# Patient Record
Sex: FEMALE | Race: WHITE | URBAN - METROPOLITAN AREA
[De-identification: names, ages, dates, MRNs, and addresses within clinical notes are randomized per-mention and may not be internally consistent; named-entity substitution may affect disease eponyms.]

---

## 2017-06-11 ENCOUNTER — INPATIENT (INPATIENT)
Facility: HOSPITAL | Age: 82
LOS: 3 days | DRG: 208 | End: 2017-06-15
Attending: INTERNAL MEDICINE | Admitting: INTERNAL MEDICINE
Payer: MEDICARE

## 2017-06-11 VITALS
HEIGHT: 60 IN | TEMPERATURE: 98 F | OXYGEN SATURATION: 99 % | DIASTOLIC BLOOD PRESSURE: 70 MMHG | HEART RATE: 89 BPM | WEIGHT: 95.02 LBS | RESPIRATION RATE: 19 BRPM | SYSTOLIC BLOOD PRESSURE: 106 MMHG

## 2017-06-11 DIAGNOSIS — Z29.9 ENCOUNTER FOR PROPHYLACTIC MEASURES, UNSPECIFIED: ICD-10-CM

## 2017-06-11 DIAGNOSIS — J69.0 PNEUMONITIS DUE TO INHALATION OF FOOD AND VOMIT: ICD-10-CM

## 2017-06-11 DIAGNOSIS — R74.8 ABNORMAL LEVELS OF OTHER SERUM ENZYMES: ICD-10-CM

## 2017-06-11 DIAGNOSIS — I48.91 UNSPECIFIED ATRIAL FIBRILLATION: ICD-10-CM

## 2017-06-11 DIAGNOSIS — J18.9 PNEUMONIA, UNSPECIFIED ORGANISM: ICD-10-CM

## 2017-06-11 DIAGNOSIS — D68.62 LUPUS ANTICOAGULANT SYNDROME: ICD-10-CM

## 2017-06-11 LAB
ALBUMIN SERPL ELPH-MCNC: 2.1 G/DL — LOW (ref 3.5–5)
ALP SERPL-CCNC: 116 U/L — SIGNIFICANT CHANGE UP (ref 40–120)
ALT FLD-CCNC: 227 U/L DA — HIGH (ref 10–60)
ANION GAP SERPL CALC-SCNC: 6 MMOL/L — SIGNIFICANT CHANGE UP (ref 5–17)
APPEARANCE UR: CLEAR — SIGNIFICANT CHANGE UP
APTT BLD: 46.2 SEC — HIGH (ref 27.5–37.4)
AST SERPL-CCNC: 239 U/L — HIGH (ref 10–40)
BILIRUB SERPL-MCNC: 1.3 MG/DL — HIGH (ref 0.2–1.2)
BILIRUB UR-MCNC: NEGATIVE — SIGNIFICANT CHANGE UP
BUN SERPL-MCNC: 17 MG/DL — SIGNIFICANT CHANGE UP (ref 7–18)
CALCIUM SERPL-MCNC: 8.9 MG/DL — SIGNIFICANT CHANGE UP (ref 8.4–10.5)
CHLORIDE SERPL-SCNC: 101 MMOL/L — SIGNIFICANT CHANGE UP (ref 96–108)
CO2 SERPL-SCNC: 32 MMOL/L — HIGH (ref 22–31)
COLOR SPEC: YELLOW — SIGNIFICANT CHANGE UP
CREAT SERPL-MCNC: 0.81 MG/DL — SIGNIFICANT CHANGE UP (ref 0.5–1.3)
DIFF PNL FLD: ABNORMAL
GLUCOSE SERPL-MCNC: 84 MG/DL — SIGNIFICANT CHANGE UP (ref 70–99)
GLUCOSE UR QL: NEGATIVE — SIGNIFICANT CHANGE UP
HCT VFR BLD CALC: 37.8 % — SIGNIFICANT CHANGE UP (ref 34.5–45)
HGB BLD-MCNC: 11.9 G/DL — SIGNIFICANT CHANGE UP (ref 11.5–15.5)
INR BLD: 2.17 RATIO — HIGH (ref 0.88–1.16)
KETONES UR-MCNC: NEGATIVE — SIGNIFICANT CHANGE UP
LEUKOCYTE ESTERASE UR-ACNC: ABNORMAL
MCHC RBC-ENTMCNC: 31.5 GM/DL — LOW (ref 32–36)
MCHC RBC-ENTMCNC: 31.8 PG — SIGNIFICANT CHANGE UP (ref 27–34)
MCV RBC AUTO: 101 FL — HIGH (ref 80–100)
NITRITE UR-MCNC: POSITIVE
PH UR: 8 — SIGNIFICANT CHANGE UP (ref 5–8)
PLATELET # BLD AUTO: 296 K/UL — SIGNIFICANT CHANGE UP (ref 150–400)
POTASSIUM SERPL-MCNC: 5 MMOL/L — SIGNIFICANT CHANGE UP (ref 3.5–5.3)
POTASSIUM SERPL-SCNC: 5 MMOL/L — SIGNIFICANT CHANGE UP (ref 3.5–5.3)
PROT SERPL-MCNC: 7.4 G/DL — SIGNIFICANT CHANGE UP (ref 6–8.3)
PROT UR-MCNC: 100
PROTHROM AB SERPL-ACNC: 24 SEC — HIGH (ref 9.8–12.7)
RAPID RVP RESULT: SIGNIFICANT CHANGE UP
RBC # BLD: 3.74 M/UL — LOW (ref 3.8–5.2)
RBC # FLD: 15.2 % — HIGH (ref 10.3–14.5)
SODIUM SERPL-SCNC: 139 MMOL/L — SIGNIFICANT CHANGE UP (ref 135–145)
SP GR SPEC: 1.01 — SIGNIFICANT CHANGE UP (ref 1.01–1.02)
TROPONIN I SERPL-MCNC: <0.015 NG/ML — SIGNIFICANT CHANGE UP (ref 0–0.04)
UROBILINOGEN FLD QL: 4
WBC # BLD: 17.1 K/UL — HIGH (ref 3.8–10.5)
WBC # FLD AUTO: 17.1 K/UL — HIGH (ref 3.8–10.5)

## 2017-06-11 PROCEDURE — 99285 EMERGENCY DEPT VISIT HI MDM: CPT

## 2017-06-11 PROCEDURE — 76705 ECHO EXAM OF ABDOMEN: CPT | Mod: 26

## 2017-06-11 PROCEDURE — 71275 CT ANGIOGRAPHY CHEST: CPT | Mod: 26

## 2017-06-11 RX ORDER — SODIUM CHLORIDE 9 MG/ML
1000 INJECTION INTRAMUSCULAR; INTRAVENOUS; SUBCUTANEOUS
Qty: 0 | Refills: 0 | Status: DISCONTINUED | OUTPATIENT
Start: 2017-06-11 | End: 2017-06-13

## 2017-06-11 RX ORDER — ONDANSETRON 8 MG/1
4 TABLET, FILM COATED ORAL ONCE
Qty: 0 | Refills: 0 | Status: COMPLETED | OUTPATIENT
Start: 2017-06-11 | End: 2017-06-11

## 2017-06-11 RX ORDER — IPRATROPIUM/ALBUTEROL SULFATE 18-103MCG
3 AEROSOL WITH ADAPTER (GRAM) INHALATION EVERY 6 HOURS
Qty: 0 | Refills: 0 | Status: DISCONTINUED | OUTPATIENT
Start: 2017-06-11 | End: 2017-06-13

## 2017-06-11 RX ORDER — ALPRAZOLAM 0.25 MG
0.25 TABLET ORAL DAILY
Qty: 0 | Refills: 0 | Status: DISCONTINUED | OUTPATIENT
Start: 2017-06-11 | End: 2017-06-12

## 2017-06-11 RX ORDER — AZITHROMYCIN 500 MG/1
500 TABLET, FILM COATED ORAL ONCE
Qty: 0 | Refills: 0 | Status: COMPLETED | OUTPATIENT
Start: 2017-06-11 | End: 2017-06-11

## 2017-06-11 RX ORDER — PIPERACILLIN AND TAZOBACTAM 4; .5 G/20ML; G/20ML
3000 INJECTION, POWDER, LYOPHILIZED, FOR SOLUTION INTRAVENOUS ONCE
Qty: 3000 | Refills: 0 | Status: DISCONTINUED | OUTPATIENT
Start: 2017-06-11 | End: 2017-06-11

## 2017-06-11 RX ORDER — LATANOPROST 0.05 MG/ML
1 SOLUTION/ DROPS OPHTHALMIC; TOPICAL AT BEDTIME
Qty: 0 | Refills: 0 | Status: DISCONTINUED | OUTPATIENT
Start: 2017-06-11 | End: 2017-06-15

## 2017-06-11 RX ORDER — AMIODARONE HYDROCHLORIDE 400 MG/1
200 TABLET ORAL DAILY
Qty: 0 | Refills: 0 | Status: DISCONTINUED | OUTPATIENT
Start: 2017-06-11 | End: 2017-06-15

## 2017-06-11 RX ORDER — TIOTROPIUM BROMIDE 18 UG/1
1 CAPSULE ORAL; RESPIRATORY (INHALATION) DAILY
Qty: 0 | Refills: 0 | Status: DISCONTINUED | OUTPATIENT
Start: 2017-06-11 | End: 2017-06-13

## 2017-06-11 RX ORDER — VANCOMYCIN HCL 1 G
750 VIAL (EA) INTRAVENOUS ONCE
Qty: 0 | Refills: 0 | Status: COMPLETED | OUTPATIENT
Start: 2017-06-11 | End: 2017-06-11

## 2017-06-11 RX ORDER — DILTIAZEM HCL 120 MG
300 CAPSULE, EXT RELEASE 24 HR ORAL DAILY
Qty: 0 | Refills: 0 | Status: DISCONTINUED | OUTPATIENT
Start: 2017-06-11 | End: 2017-06-13

## 2017-06-11 RX ORDER — ACETAMINOPHEN 500 MG
650 TABLET ORAL EVERY 6 HOURS
Qty: 0 | Refills: 0 | Status: DISCONTINUED | OUTPATIENT
Start: 2017-06-11 | End: 2017-06-13

## 2017-06-11 RX ORDER — PIPERACILLIN AND TAZOBACTAM 4; .5 G/20ML; G/20ML
3.38 INJECTION, POWDER, LYOPHILIZED, FOR SOLUTION INTRAVENOUS ONCE
Qty: 0 | Refills: 0 | Status: COMPLETED | OUTPATIENT
Start: 2017-06-11 | End: 2017-06-11

## 2017-06-11 RX ORDER — PANTOPRAZOLE SODIUM 20 MG/1
40 TABLET, DELAYED RELEASE ORAL
Qty: 0 | Refills: 0 | Status: DISCONTINUED | OUTPATIENT
Start: 2017-06-11 | End: 2017-06-13

## 2017-06-11 RX ORDER — VANCOMYCIN HCL 1 G
750 VIAL (EA) INTRAVENOUS EVERY 12 HOURS
Qty: 0 | Refills: 0 | Status: DISCONTINUED | OUTPATIENT
Start: 2017-06-11 | End: 2017-06-14

## 2017-06-11 RX ORDER — PIPERACILLIN AND TAZOBACTAM 4; .5 G/20ML; G/20ML
3.38 INJECTION, POWDER, LYOPHILIZED, FOR SOLUTION INTRAVENOUS EVERY 8 HOURS
Qty: 0 | Refills: 0 | Status: DISCONTINUED | OUTPATIENT
Start: 2017-06-11 | End: 2017-06-15

## 2017-06-11 RX ORDER — WARFARIN SODIUM 2.5 MG/1
2 TABLET ORAL ONCE
Qty: 0 | Refills: 0 | Status: COMPLETED | OUTPATIENT
Start: 2017-06-11 | End: 2017-06-11

## 2017-06-11 RX ORDER — SODIUM CHLORIDE 9 MG/ML
1000 INJECTION INTRAMUSCULAR; INTRAVENOUS; SUBCUTANEOUS ONCE
Qty: 0 | Refills: 0 | Status: COMPLETED | OUTPATIENT
Start: 2017-06-11 | End: 2017-06-11

## 2017-06-11 RX ADMIN — AZITHROMYCIN 250 MILLIGRAM(S): 500 TABLET, FILM COATED ORAL at 12:17

## 2017-06-11 RX ADMIN — Medication 650 MILLIGRAM(S): at 21:16

## 2017-06-11 RX ADMIN — Medication 650 MILLIGRAM(S): at 21:50

## 2017-06-11 RX ADMIN — Medication 300 MILLIGRAM(S): at 14:38

## 2017-06-11 RX ADMIN — PANTOPRAZOLE SODIUM 40 MILLIGRAM(S): 20 TABLET, DELAYED RELEASE ORAL at 14:42

## 2017-06-11 RX ADMIN — ONDANSETRON 4 MILLIGRAM(S): 8 TABLET, FILM COATED ORAL at 11:28

## 2017-06-11 RX ADMIN — Medication 3 MILLILITER(S): at 14:42

## 2017-06-11 RX ADMIN — SODIUM CHLORIDE 70 MILLILITER(S): 9 INJECTION INTRAMUSCULAR; INTRAVENOUS; SUBCUTANEOUS at 18:08

## 2017-06-11 RX ADMIN — PIPERACILLIN AND TAZOBACTAM 25 GRAM(S): 4; .5 INJECTION, POWDER, LYOPHILIZED, FOR SOLUTION INTRAVENOUS at 21:15

## 2017-06-11 RX ADMIN — TIOTROPIUM BROMIDE 1 CAPSULE(S): 18 CAPSULE ORAL; RESPIRATORY (INHALATION) at 14:39

## 2017-06-11 RX ADMIN — Medication 150 MILLIGRAM(S): at 14:38

## 2017-06-11 RX ADMIN — PIPERACILLIN AND TAZOBACTAM 200 GRAM(S): 4; .5 INJECTION, POWDER, LYOPHILIZED, FOR SOLUTION INTRAVENOUS at 13:58

## 2017-06-11 RX ADMIN — WARFARIN SODIUM 2 MILLIGRAM(S): 2.5 TABLET ORAL at 21:15

## 2017-06-11 RX ADMIN — LATANOPROST 1 DROP(S): 0.05 SOLUTION/ DROPS OPHTHALMIC; TOPICAL at 21:16

## 2017-06-11 RX ADMIN — SODIUM CHLORIDE 4000 MILLILITER(S): 9 INJECTION INTRAMUSCULAR; INTRAVENOUS; SUBCUTANEOUS at 13:17

## 2017-06-11 RX ADMIN — SODIUM CHLORIDE 70 MILLILITER(S): 9 INJECTION INTRAMUSCULAR; INTRAVENOUS; SUBCUTANEOUS at 15:47

## 2017-06-11 RX ADMIN — Medication 150 MILLIGRAM(S): at 18:09

## 2017-06-11 NOTE — H&P ADULT - HISTORY OF PRESENT ILLNESS
85 year old female from home lives with daughter PMH of Lupus anticoagulant DVT b/l 15 years ago on coumadin , PAF on amiodarone , htn , hld  copd on 3 liter n/c at home presents to the ER with complaints of feeling malaise , SOB and productive cough for the past 2 days , patients states that she was in her usual state of health 2 days ago when she started getting naseous ( denies any vomiting) , she also had a runny nose and malaise claims the symptoms were similiar to when she had PNA , over the course of the next 2 days she developed productive cough , thick yellowish sputum about a tablespoon full each time she would cough , patient also noted subjective feve with chills , and decided to come to the hospital . CP, no abdominal pain, no vomiting, no urinary difficulties , denies any recent travel . , has a grand child who had URI like symptoms . off note patient also notes.Pt had a mechanical fall last week without head strike and bruised her L calf. ROS is negative except above.    In the ER patient's VS t 97.8 , hr 89 . bp 106/70 RR 19 pulse ox of 99 on 2 liters n/c , labs significant for wbc count of 17 . h/h 11/37.8 , INR 2.17 . elevated liver enzymes of bili 1.3 ,  . ALT of 227 . cta was done which showed:SIgnificant amount of debris within the mid to distal trachea and within   the segmental airway branches to the right lower lobe concerning for   mucous plugging or aspiration. No dense airspace consolidation, although scattered areas of tree-in-bud opacities are seen in the bilateral bases, which may be infectious in etiology.Patient got 1 dose of zosyn , azithromycin and vancomcyin in the ER. 85 year old female from home lives with daughter PMH of Lupus anticoagulant DVT b/l 15 years ago on coumadin , PAF on amiodarone , htn , hld  copd on 3 liter n/c at home presents to the ER with complaints of feeling malaise , SOB and productive cough for the past 2 days , patients states that she was in her usual state of health 2 days ago when she started getting naseous ( denies any vomiting) , she also had a runny nose and malaise claims the symptoms were similiar to when she had PNA , over the course of the next 2 days she developed productive cough , thick yellowish sputum about a tablespoon full each time she would cough , patient also noted subjective feve with chills , and decided to come to the hospital . CP, no abdominal pain, no vomiting, no urinary difficulties , denies any recent travel . , has a grand child who had URI like symptoms . off note patient also notes.Pt had a mechanical fall last week without head strike and bruised her L calf. ROS is negative except above.    In the ER patient's VS t 97.8 , hr 89 . bp 106/70 RR 19 pulse ox of 99 on 2 liters n/c , labs significant for wbc count of 17 . h/h 11/37.8 , INR 2.17 . elevated liver enzymes of bili 1.3 ,  . ALT of 227 . cta was done which showed:SIgnificant amount of debris within the mid to distal trachea and within   the segmental airway branches to the right lower lobe concerning for   mucous plugging or aspiration. No dense airspace consolidation, although scattered areas of tree-in-bud opacities are seen in the bilateral bases, which may be infectious in etiology.Patient got 1 dose of zosyn , azithromycin and vancomcyin in the ER.    SH: FORMER SMOKER 1 PPD /20 YEARS quit 20 years ago

## 2017-06-11 NOTE — H&P ADULT - ASSESSMENT
85 year old female from home lives with daughter PMH of Lupus anticoagulant DVT b/l 15 years ago on coumadin , PAF on amiodarone , htn , hld  copd on 3 liter n/c at home presents to the ER with complaints of feeling malaise , SOB and productive cough for the past 2 days is being admitted for pna.

## 2017-06-11 NOTE — ED PROVIDER NOTE - CARE PLAN
Principal Discharge DX:	Aspiration pneumonia of right lower lobe, unspecified aspiration pneumonia type

## 2017-06-11 NOTE — H&P ADULT - PROBLEM SELECTOR PLAN 1
PNA: WBC COUNT OF 17   qsofa score 0 does not meet sepsis criteria  nonsignificant amount of debris within the mid to distal trachea and within   the segmental airway branches to the right lower lobe concerning for   mucous plugging or aspiration. No dense airspace consolidation, although scattered areas of tree-in-bud opacities are seen in the bilateral bases, which may be infectious in etiology.Patient got 1 dose of zosyn , azithromycin and vancomcyin in the ER.  aspiration precautions  c/w iv abx f/u cultures nebs round the clock , oxygen supplementation  id evaluation PNA: WBC COUNT OF 17   qsofa score 0 does not meet sepsis criteria  nonsignificant amount of debris within the mid to distal trachea and within   the segmental airway branches to the right lower lobe concerning for   mucous plugging or aspiration. No dense airspace consolidation, although scattered areas of tree-in-bud opacities are seen in the bilateral bases, which may be infectious in etiology.Patient got 1 dose of zosyn , azithromycin and vancomcyin in the ER.  aspiration precautions  c/w iv abx f/u cultures nebs round the clock , oxygen supplementation  id evaluation  case d/w Dr Samuels c/w iv zosyn and vancocmyin Dr Britt ID

## 2017-06-11 NOTE — ED PROVIDER NOTE - MEDICAL DECISION MAKING DETAILS
Will get labs, CXR, give nausea meds, and reassess. labs pertinent for leukocytosis, cta pertinent for no PE, + debris in RLL and trachea concerning for aspiration pna  covered with zosyn, vanc, azithro. sent viral panel given sob. pan cultured. discussed results with pt. can benefit from speech and swallow eval during admission.

## 2017-06-11 NOTE — ED PROVIDER NOTE - OBJECTIVE STATEMENT
84 y/o female with PMHx of Lupus Anticoagulant and DVT presents to the ED c/o nausea and feeling sick for a few days. Pt notes associated chills and SOB. Pt had a mechanical fall last week without head strike and bruised her L calf. Pt is on blood thinners for Lupus anticoagulant and a blood clot 17 years ago. Pt denies fever, CP, cough, abdominal pain, vomiting, urinary difficulties, or any other complaints. NKDA.

## 2017-06-11 NOTE — ED PROVIDER NOTE - NS ED MD SCRIBE ATTENDING SCRIBE SECTIONS
HISTORY OF PRESENT ILLNESS/DISPOSITION/VITAL SIGNS( Pullset)/REVIEW OF SYSTEMS/PHYSICAL EXAM/PAST MEDICAL/SURGICAL/SOCIAL HISTORY

## 2017-06-11 NOTE — ED ADULT NURSE NOTE - OBJECTIVE STATEMENT
axox3 ambulates with assistance presented with daughter c/o generalized body ache and difficulty in breathing axox3 ambulates with assistance presented with daughter c/o generalized body ache and difficulty in breathing Pt noted cathectic with ecchymosis to upper and lower extremities

## 2017-06-11 NOTE — H&P ADULT - PROBLEM SELECTOR PLAN 2
elevated liver enzymes: elevated liver enzymes of bili 1.3 ,  . ALT of 227 .   uncertain etiology , patient is not complaining of abdominal pain  elevated pro bnp [id number]   f/u us gall bladder f/u hepatitis profile hold statuin f/u echo to r/o congestion

## 2017-06-11 NOTE — ED PROVIDER NOTE - MUSCULOSKELETAL, MLM
Spine appears normal, range of motion is not limited, no muscle or joint tenderness. No bony deformity.

## 2017-06-11 NOTE — H&P ADULT - ATTENDING COMMENTS
85 year old female from home lives with daughter PMH of Lupus anticoagulant DVT b/l 15 years ago on coumadin , PAF on amiodarone , htn , hld  copd on 3 liter n/c at home presents to the ER with complaints of feeling malaise , SOB and productive cough for the past 2 days , patients states that she was in her usual state of health 2 days ago when she started getting naseous ( denies any vomiting) , she also had a runny nose and malaise claims the symptoms were similiar to when she had PNA , over the course of the next 2 days she developed productive cough , thick yellowish sputum about a tablespoon full each time she would cough , patient also noted subjective feve with chills , and decided to come to the hospital . CP, no abdominal pain, no vomiting, no urinary difficulties , denies any recent travel . , has a grand child who had URI like symptoms . off note patient also notes.Pt had a mechanical fall last week without head strike and bruised her L calf. ROS is negative except above.    In the ER patient's VS t 97.8 , hr 89 . bp 106/70 RR 19 pulse ox of 99 on 2 liters n/c , labs significant for wbc count of 17 . h/h 11/37.8 , INR 2.17 . elevated liver enzymes of bili 1.3 ,  . ALT of 227 . cta was done which showed: Significant amount of debris within the mid to distal trachea and within the segmental airway branches to the right lower lobe concerning for mucous plugging or aspiration. No dense airspace consolidation, although scattered areas of tree-in-bud opacities are seen in the bilateral bases, which may be infectious in etiology. Patient got 1 dose of zosyn , azithromycin and vancomcyin in the ER.    SH: FORMER SMOKER 1 PPD /20 YEARS quit 20 years ago     pt seen in bed, vitals stable except for , physical exam reveals lungs right lower lobe ronchi, heart s1s2, abd soft nd nt bs+, ext no edema. labs and diagnostic test result reviewed.    assessment  --  pneumonia possible 2nd to aspiration, sepsis, microscopic hematuria, proteinuria, transaminitis, cholelithiasis,  h/o Lupus anticoagulant DVT b/l 15 years ago on coumadin , PAF on amiodarone , htn , hld  copd on 3 liter n/c at home    plan  --  admit to med, vanco, zosyn, atrov and prov nebs, supplemental oxygen, cont preadmit home meds, gi and dvt profilaxis,   cbc, bmp, mg, phos, lipids, tsh, bld cx, ua, ucx, speut cx,     echo    id cons  pulm cons  urology cons 85 year old female from home lives with daughter PMH of Lupus anticoagulant DVT b/l 15 years ago on coumadin , PAF on amiodarone , htn , hld  copd on 3 liter n/c at home presents to the ER with complaints of feeling malaise , SOB and productive cough for the past 2 days , patients states that she was in her usual state of health 2 days ago when she started getting naseous ( denies any vomiting) , she also had a runny nose and malaise claims the symptoms were similiar to when she had PNA , over the course of the next 2 days she developed productive cough , thick yellowish sputum about a tablespoon full each time she would cough , patient also noted subjective feve with chills , and decided to come to the hospital . CP, no abdominal pain, no vomiting, no urinary difficulties , denies any recent travel . , has a grand child who had URI like symptoms . off note patient also notes.Pt had a mechanical fall last week without head strike and bruised her L calf. ROS is negative except above.    In the ER patient's VS t 97.8 , hr 89 . bp 106/70 RR 19 pulse ox of 99 on 2 liters n/c , labs significant for wbc count of 17 . h/h 11/37.8 , INR 2.17 . elevated liver enzymes of bili 1.3 ,  . ALT of 227 . cta was done which showed: Significant amount of debris within the mid to distal trachea and within the segmental airway branches to the right lower lobe concerning for mucous plugging or aspiration. No dense airspace consolidation, although scattered areas of tree-in-bud opacities are seen in the bilateral bases, which may be infectious in etiology. Patient got 1 dose of zosyn , azithromycin and vancomcyin in the ER.    SH: FORMER SMOKER 1 PPD /20 YEARS quit 20 years ago     pt seen in bed, vitals stable except for episode of hypotension, physical exam reveals lungs with decrease breath sounds right lower lobe, heart s1s2, abd soft nd nt bs+, ext left leg ecchimosis and dry skin scalesno edema. labs and diagnostic test result reviewed.    assessment  --  pneumonia possible 2nd to aspiration, sepsis, microscopic hematuria, proteinuria, transaminitis, cholelithiasis,  h/o Lupus anticoagulant DVT b/l 15 years ago on coumadin , PAF on amiodarone , htn , hld  copd on 3 liter n/c at home    plan  --  admit to med, vanco, zosyn, atrov and prov nebs, supplemental oxygen, cont preadmit home meds, gi and dvt profilaxis,   cbc, bmp, mg, phos, lipids, tsh, inr, bld cx, ua, ucx, speut cx,     echo    id cons  pulm cons  urology cons

## 2017-06-12 DIAGNOSIS — I10 ESSENTIAL (PRIMARY) HYPERTENSION: ICD-10-CM

## 2017-06-12 DIAGNOSIS — J90 PLEURAL EFFUSION, NOT ELSEWHERE CLASSIFIED: ICD-10-CM

## 2017-06-12 DIAGNOSIS — E78.5 HYPERLIPIDEMIA, UNSPECIFIED: ICD-10-CM

## 2017-06-12 DIAGNOSIS — D64.9 ANEMIA, UNSPECIFIED: ICD-10-CM

## 2017-06-12 DIAGNOSIS — R80.9 PROTEINURIA, UNSPECIFIED: ICD-10-CM

## 2017-06-12 DIAGNOSIS — N39.0 URINARY TRACT INFECTION, SITE NOT SPECIFIED: ICD-10-CM

## 2017-06-12 DIAGNOSIS — R31.29 OTHER MICROSCOPIC HEMATURIA: ICD-10-CM

## 2017-06-12 DIAGNOSIS — J44.9 CHRONIC OBSTRUCTIVE PULMONARY DISEASE, UNSPECIFIED: ICD-10-CM

## 2017-06-12 LAB
24R-OH-CALCIDIOL SERPL-MCNC: 8 NG/ML — LOW (ref 30–100)
ALBUMIN SERPL ELPH-MCNC: 1.7 G/DL — LOW (ref 3.5–5)
ALP SERPL-CCNC: 95 U/L — SIGNIFICANT CHANGE UP (ref 40–120)
ALT FLD-CCNC: 182 U/L DA — HIGH (ref 10–60)
ANION GAP SERPL CALC-SCNC: 7 MMOL/L — SIGNIFICANT CHANGE UP (ref 5–17)
APTT BLD: 44.9 SEC — HIGH (ref 27.5–37.4)
AST SERPL-CCNC: 171 U/L — HIGH (ref 10–40)
BASOPHILS # BLD AUTO: 0.1 K/UL — SIGNIFICANT CHANGE UP (ref 0–0.2)
BASOPHILS NFR BLD AUTO: 0.5 % — SIGNIFICANT CHANGE UP (ref 0–2)
BILIRUB SERPL-MCNC: 0.7 MG/DL — SIGNIFICANT CHANGE UP (ref 0.2–1.2)
BUN SERPL-MCNC: 12 MG/DL — SIGNIFICANT CHANGE UP (ref 7–18)
CALCIUM SERPL-MCNC: 7.9 MG/DL — LOW (ref 8.4–10.5)
CHLORIDE SERPL-SCNC: 104 MMOL/L — SIGNIFICANT CHANGE UP (ref 96–108)
CHOLEST SERPL-MCNC: 158 MG/DL — SIGNIFICANT CHANGE UP (ref 10–199)
CO2 SERPL-SCNC: 27 MMOL/L — SIGNIFICANT CHANGE UP (ref 22–31)
CREAT SERPL-MCNC: 0.46 MG/DL — LOW (ref 0.5–1.3)
EOSINOPHIL # BLD AUTO: 0 K/UL — SIGNIFICANT CHANGE UP (ref 0–0.5)
EOSINOPHIL NFR BLD AUTO: 0.3 % — SIGNIFICANT CHANGE UP (ref 0–6)
GLUCOSE SERPL-MCNC: 82 MG/DL — SIGNIFICANT CHANGE UP (ref 70–99)
HAV IGM SER-ACNC: SIGNIFICANT CHANGE UP
HBA1C BLD-MCNC: 5.5 % — SIGNIFICANT CHANGE UP (ref 4–5.6)
HBV CORE IGM SER-ACNC: SIGNIFICANT CHANGE UP
HBV SURFACE AG SER-ACNC: SIGNIFICANT CHANGE UP
HCT VFR BLD CALC: 31.6 % — LOW (ref 34.5–45)
HCV AB S/CO SERPL IA: 0.17 S/CO — SIGNIFICANT CHANGE UP
HCV AB SERPL-IMP: SIGNIFICANT CHANGE UP
HDLC SERPL-MCNC: 71 MG/DL — SIGNIFICANT CHANGE UP (ref 40–125)
HGB BLD-MCNC: 10.1 G/DL — LOW (ref 11.5–15.5)
INR BLD: 2.33 RATIO — HIGH (ref 0.88–1.16)
LIPID PNL WITH DIRECT LDL SERPL: 70 MG/DL — SIGNIFICANT CHANGE UP
LYMPHOCYTES # BLD AUTO: 0.8 K/UL — LOW (ref 1–3.3)
LYMPHOCYTES # BLD AUTO: 6.2 % — LOW (ref 13–44)
MAGNESIUM SERPL-MCNC: 2.1 MG/DL — SIGNIFICANT CHANGE UP (ref 1.6–2.6)
MCHC RBC-ENTMCNC: 32 GM/DL — SIGNIFICANT CHANGE UP (ref 32–36)
MCHC RBC-ENTMCNC: 32.4 PG — SIGNIFICANT CHANGE UP (ref 27–34)
MCV RBC AUTO: 101 FL — HIGH (ref 80–100)
MONOCYTES # BLD AUTO: 1.6 K/UL — HIGH (ref 0–0.9)
MONOCYTES NFR BLD AUTO: 11.8 % — SIGNIFICANT CHANGE UP (ref 2–14)
NEUTROPHILS # BLD AUTO: 11.1 K/UL — HIGH (ref 1.8–7.4)
NEUTROPHILS NFR BLD AUTO: 81.2 % — HIGH (ref 43–77)
PHOSPHATE SERPL-MCNC: 2.5 MG/DL — SIGNIFICANT CHANGE UP (ref 2.5–4.5)
PLATELET # BLD AUTO: 228 K/UL — SIGNIFICANT CHANGE UP (ref 150–400)
POTASSIUM SERPL-MCNC: 3.7 MMOL/L — SIGNIFICANT CHANGE UP (ref 3.5–5.3)
POTASSIUM SERPL-SCNC: 3.7 MMOL/L — SIGNIFICANT CHANGE UP (ref 3.5–5.3)
PROT SERPL-MCNC: 5.8 G/DL — LOW (ref 6–8.3)
PROTHROM AB SERPL-ACNC: 25.8 SEC — HIGH (ref 9.8–12.7)
RBC # BLD: 3.13 M/UL — LOW (ref 3.8–5.2)
RBC # FLD: 15.4 % — HIGH (ref 10.3–14.5)
SODIUM SERPL-SCNC: 138 MMOL/L — SIGNIFICANT CHANGE UP (ref 135–145)
TOTAL CHOLESTEROL/HDL RATIO MEASUREMENT: 2.2 RATIO — LOW (ref 3.3–7.1)
TRIGL SERPL-MCNC: 84 MG/DL — SIGNIFICANT CHANGE UP (ref 10–149)
TSH SERPL-MCNC: 0.5 UU/ML — SIGNIFICANT CHANGE UP (ref 0.34–4.82)
WBC # BLD: 13.6 K/UL — HIGH (ref 3.8–10.5)
WBC # FLD AUTO: 13.6 K/UL — HIGH (ref 3.8–10.5)

## 2017-06-12 RX ORDER — WARFARIN SODIUM 2.5 MG/1
2 TABLET ORAL ONCE
Qty: 0 | Refills: 0 | Status: COMPLETED | OUTPATIENT
Start: 2017-06-12 | End: 2017-06-12

## 2017-06-12 RX ORDER — ALPRAZOLAM 0.25 MG
0.25 TABLET ORAL AT BEDTIME
Qty: 0 | Refills: 0 | Status: DISCONTINUED | OUTPATIENT
Start: 2017-06-12 | End: 2017-06-13

## 2017-06-12 RX ORDER — ONDANSETRON 8 MG/1
4 TABLET, FILM COATED ORAL EVERY 6 HOURS
Qty: 0 | Refills: 0 | Status: DISCONTINUED | OUTPATIENT
Start: 2017-06-12 | End: 2017-06-15

## 2017-06-12 RX ADMIN — LATANOPROST 1 DROP(S): 0.05 SOLUTION/ DROPS OPHTHALMIC; TOPICAL at 22:22

## 2017-06-12 RX ADMIN — Medication 300 MILLIGRAM(S): at 05:33

## 2017-06-12 RX ADMIN — PANTOPRAZOLE SODIUM 40 MILLIGRAM(S): 20 TABLET, DELAYED RELEASE ORAL at 05:34

## 2017-06-12 RX ADMIN — WARFARIN SODIUM 2 MILLIGRAM(S): 2.5 TABLET ORAL at 22:22

## 2017-06-12 RX ADMIN — ONDANSETRON 4 MILLIGRAM(S): 8 TABLET, FILM COATED ORAL at 09:49

## 2017-06-12 RX ADMIN — PIPERACILLIN AND TAZOBACTAM 25 GRAM(S): 4; .5 INJECTION, POWDER, LYOPHILIZED, FOR SOLUTION INTRAVENOUS at 22:22

## 2017-06-12 RX ADMIN — PIPERACILLIN AND TAZOBACTAM 25 GRAM(S): 4; .5 INJECTION, POWDER, LYOPHILIZED, FOR SOLUTION INTRAVENOUS at 17:19

## 2017-06-12 RX ADMIN — PIPERACILLIN AND TAZOBACTAM 25 GRAM(S): 4; .5 INJECTION, POWDER, LYOPHILIZED, FOR SOLUTION INTRAVENOUS at 05:23

## 2017-06-12 RX ADMIN — Medication 0.25 MILLIGRAM(S): at 22:22

## 2017-06-12 RX ADMIN — AMIODARONE HYDROCHLORIDE 200 MILLIGRAM(S): 400 TABLET ORAL at 05:33

## 2017-06-12 RX ADMIN — Medication 150 MILLIGRAM(S): at 18:37

## 2017-06-12 RX ADMIN — Medication 150 MILLIGRAM(S): at 05:21

## 2017-06-12 NOTE — PROGRESS NOTE ADULT - SUBJECTIVE AND OBJECTIVE BOX
INTERVAL HPI/OVERNIGHT EVENTS: No acute overnight events , reported feeling nausea , improved SOB  Patient is a 85y old  Female who presents with a chief complaint of SOB, productive cough , malaise     T(C): 36.2, Max: 36.8 ( @ 20:40)  HR: 78 (76 - 89)  BP: 107/44 (97/32 - 131/53)  RR: 16 (16 - 19)  SpO2: 98% (96% - 100%)        LABS:                        10.1   13.6  )-----------( 228      ( 2017 06:32 )             31.6     06-12    138  |  104  |  12  ----------------------------<  82  3.7   |  27  |  0.46<L>    Ca    7.9<L>      2017 06:32  Phos  2.5     12  Mg     2.1     -12    TPro  5.8<L>  /  Alb  1.7<L>  /  TBili  0.7  /  DBili  x   /  AST  171<H>  /  ALT  182<H>  /  AlkPhos  95  06-12    PT/INR - ( 2017 06:32 )   PT: 25.8 sec;   INR: 2.33 ratio         PTT - ( 2017 06:32 )  PTT:44.9 sec  Urinalysis Basic - ( 2017 13:25 )    Color: Yellow / Appearance: Clear / S.010 / pH: x  Gluc: x / Ketone: Negative  / Bili: Negative / Urobili: 4   Blood: x / Protein: 100 / Nitrite: Positive   Leuk Esterase: Moderate / RBC: 25-50 /HPF / WBC 3-5 /HPF   Sq Epi: x / Non Sq Epi: Few / Bacteria: Moderate /HPF          RADIOLOGY & ADDITIONAL TESTS:    Imaging Personally Reviewed:  [ ] YES  [ ] NO    Consultant(s) Notes Reviewed:  [ ] YES  [ ] NO    PHYSICAL EXAM:  GENERAL: NAD, on 3 L NC   NECK: Supple, No JVD, Normal thyroid  NERVOUS SYSTEM:  Alert & Oriented X3, Good concentration;   CHEST/LUNG: Left base crackles   HEART: Regular rate and rhythm  ABDOMEN: Soft, Nontender, Nondistended; Bowel sounds present  EXTREMITIES:  No edema, No calf tenderness  SKIN: bruise over left leg, scattered senile purpura over hands.    Care Discussed with the attending [ ] YES

## 2017-06-12 NOTE — CONSULT NOTE ADULT - PROBLEM SELECTOR RECOMMENDATION 9
1- Continue IV Vancomycin.  2- Continue Zosun.  3- O2 as needed.  4- Pulmonary management.  5- F/u CXR.   6- COPD management  7- Follow vancomycin trough.

## 2017-06-12 NOTE — CONSULT NOTE ADULT - SUBJECTIVE AND OBJECTIVE BOX
HPI:  85 year old female from home lives with daughter PMH of Lupus anticoagulant DVT b/l 15 years ago on coumadin , PAF on amiodarone , htn , hld  copd on 3 liter n/c at home presents to the ER with complaints of feeling malaise , SOB and productive cough for the past 2 days , patients states that she was in her usual state of health 2 days ago when she started getting naseous ( denies any vomiting) , she also had a runny nose and malaise claims the symptoms were similiar to when she had PNA , over the course of the next 2 days she developed productive cough , thick yellowish sputum about a tablespoon full each time she would cough , patient also noted subjective feve with chills , and decided to come to the hospital . CP, no abdominal pain, no vomiting, no urinary difficulties , denies any recent travel . , has a grand child who had URI like symptoms . off note patient also notes.Pt had a mechanical fall last week without head strike and bruised her L calf. ROS is negative except above.    In the ER patient's VS t 97.8 , hr 89 . bp 106/70 RR 19 pulse ox of 99 on 2 liters n/c , labs significant for wbc count of 17 . h/h 11/37.8 , INR 2.17 . elevated liver enzymes of bili 1.3 ,  . ALT of 227 . cta was done which showed:SIgnificant amount of debris within the mid to distal trachea and within   the segmental airway branches to the right lower lobe concerning for   mucous plugging or aspiration. No dense airspace consolidation, although scattered areas of tree-in-bud opacities are seen in the bilateral bases, which may be infectious in etiology.Patient got 1 dose of zosyn , azithromycin and vancomcyin in the ER.    SH: FORMER SMOKER 1 PPD /20 YEARS quit 20 years ago (2017 13:21)      PAST MEDICAL & SURGICAL HISTORY:  DVT (deep venous thrombosis)  Lupus anticoagulant disorder  No significant past surgical history      No Known Allergies      tiotropium 18 MICROgram(s) Capsule 1Capsule(s) Inhalation daily  pantoprazole    Tablet 40milliGRAM(s) Oral before breakfast  amiodarone    Tablet 200milliGRAM(s) Oral daily  diltiazem   CD 300milliGRAM(s) Oral daily  ALPRAZolam 0.25milliGRAM(s) Oral daily  latanoprost 0.005% Ophthalmic Solution 1Drop(s) Both EYES at bedtime  sodium chloride 0.9%. 1000milliLiter(s) IV Continuous <Continuous>  ALBUTerol/ipratropium for Nebulization 3milliLiter(s) Nebulizer every 6 hours  piperacillin/tazobactam IVPB. 3.375Gram(s) IV Intermittent every 8 hours  vancomycin  IVPB 750milliGRAM(s) IV Intermittent every 12 hours  acetaminophen   Tablet. 650milliGRAM(s) Oral every 6 hours PRN  warfarin 2milliGRAM(s) Oral once  ondansetron Injectable 4milliGRAM(s) IV Push every 6 hours PRN      Social Hx: Quit smocking 30 years ago.    FAMILY HISTORY: None contributory.        ROS  [  ] UNABLE TO ELICIT    General:  [  ] None  [ x ] Fever  [ x ] Chills  [ x ] Malaise    Skin:  [ x ] None [  ] Rash  [  ] Wound  [  ] Ulcer    HEENT:  [ x ] None  [  ] Sore Throat  [  ] Nasal congestion/ runny nose  [  ] Photophobia [  ] Neck pain      Chest:  [  ] None   [  ] SOB  [ x ] Cough  [  ] None    Cardiovascular:   [ x ] None  [  ] CP  [  ] Palpitation    Gastrointestinal:  [  ] None  [  ] Abd pain   [ x ] Nausea    [  ] Vomiting   [  ] Diarrhea	     Genitourinary:  [ x ] None [  ] Polyuria   [  ] Urgency  [  ] Frequency  [  ] Dysuria    [  ]  Hematuria       Musculoskeletal:  [ x ] None [  ] Back Pain	[  ] Body aches  [  ] Joint pain    Neurological: [  ] None [  ]Dizziness  [  ]Visual Disturbance  [  ]Headaches   [ x ] Weakness      PHYSICAL EXAM:    Vital Signs Last 24 Hrs  T(C): 36.2, Max: 36.8 (- @ 20:40)  T(F): 97.1, Max: 98.2 ( @ 20:40)  HR: 78 (76 - 82)  BP: 107/44 (97/32 - 131/53)  BP(mean): --  RR: 16 (16 - 18)  SpO2: 98% (96% - 100%)    Constitutional:    HEENT: [ x ] Wnl  [  ] Pharyngeal congestion    Neck:  [ x ] Supple  [  ]Lymphadenopathy  [ x ] No JVD   [  ] JVD  [  ] Masses   [ x ] WNL    CHEST/Respiratory:  [  ]Clear to auscultation  [ x ] Rales   [  ] Rhonchi   [  ] Wheezing     [  ] Chest Tenderness      Cardiovascular:  [ x ] Reg S1 S2   [  ] Irreg S1 S2   [ x ]No Murmur  [  ] +ve Murmurs  [  ]Systolic [  ]Diastolic      Abdomen:  [ x ] Soft  [ x ] No tendrerness  [  ] Tenderness  [  ] Organomegaly  [  ] ABD Distention  [  ] Rigidity                       [ x ] No Regidity                       [ x ] No Rebound Tenderness  [ x ] No Guarding Rigidity  [  ] Rebound Tenderness[  ] Guarding Rigidity                          [  x]  +ve Bowel Sounds  [  ] Decreased Bowel Sounds    [  ] Absent Bowel Sounds                            Extremities: [ x ] No edema [  ] Edema  [  ] Clubbing   [  ] Cyanosis                         [  ] No Tender Calf muscles  [  ] Tender Calf muscles                        [  ] Palpable peripheral pulses  Neurological: [ x ] Awake  [x  ] Alert  [ x ] Oriented  x 3                            [  ] Confused  [  ] Drowzy  [  ] repond to painful stimuli  [  ] Unresponsive    Skin:  [  ] Intact [  ] Redness [  ] Thrombophlebitis  [  ] Rashes  [  ] Dry  [ x ] Achymosis L leg and foot  Ortho:  [  ] Joint Swelling  [  ] Joint erythema [  ] Joint tenderness                [  ] Increased temp. to touch  [ x ] DJD       LABS/DIAGNOSTIC TESTS                          10.1   13.6  )-----------( 228      ( 2017 06:32 )             31.6     WBC Count: 13.6 K/uL ( @ 06:32)  WBC Count: 17.1 K/uL ( @ 11:14)      06-12    138  |  104  |  12  ----------------------------<  82  3.7   |  27  |  0.46<L>    Ca    7.9<L>      2017 06:32  Phos  2.5     12  Mg     2.1     12    TPro  5.8<L>  /  Alb  1.7<L>  /  TBili  0.7  /  DBili  x   /  AST  171<H>  /  ALT  182<H>  /  AlkPhos  95  06-12      Urinalysis Basic - ( 2017 13:25 )    Color: Yellow / Appearance: Clear / S.010 / pH: x  Gluc: x / Ketone: Negative  / Bili: Negative / Urobili: 4   Blood: x / Protein: 100 / Nitrite: Positive   Leuk Esterase: Moderate / RBC: 25-50 /HPF / WBC 3-5 /HPF   Sq Epi: x / Non Sq Epi: Few / Bacteria: Moderate /HPF        LIVER FUNCTIONS - ( 2017 06:32 )  Alb: 1.7 g/dL / Pro: 5.8 g/dL / ALK PHOS: 95 U/L / ALT: 182 U/L DA / AST: 171 U/L / GGT: x             PT/INR - ( 2017 06:32 )   PT: 25.8 sec;   INR: 2.33 ratio         PTT - ( 2017 06:32 )  PTT:44.9 sec    LACTATE:      CULTURES:     None yet.    RADIOLOGY    EXAM:  CT ANGIO CHEST (W)AW IC                            PROCEDURE DATE:  2017        INTERPRETATION:  CLINICAL INFORMATION: Shortness of breath. Chest pain.   History of lupus anticoagulant.    COMPARISON: None.    PROCEDURE:   CTA of the Chest was performed with intravenous contrast.  90 ml of Omnipaque 350 was injected intravenously. 10 ml were discarded.  Sagittal and coronal reformats were performed as well as 3D   Reconstructions.      FINDINGS:    LOWER NECK: Unremarkable.    LUNGS AND LARGE AIRWAYS: Significant amount of debris within the mid to   distal trachea and within the segmental airway branches to the right   lower lobe concerning for mucous plugging or aspiration. No dense   airspace consolidation, although scatteredareas of tree-in-bud opacities   are seen in the bilateral bases. There is moderate to severe   centrilobular emphysematous changes.  PLEURA: Small right pleural effusion.  VESSELS: Atherosclerotic changes of the thoracic aorta without aneurysm.   Noacute pulmonary embolus.  HEART: Heart size is normal. No pericardial effusion.  MEDIASTINUM AND ANDREAS: No lymphadenopathy.  CHEST WALL AND AXILLA: Incompletely imaged is a 4.2 x 1.7 cm subcutaneous   lesion within the lateral aspect of the lower leftchest wall   (3:110-1:15) containing at least two types of soft tissue internally.  VISUALIZED UPPER ABDOMEN: Partially imaged is a infrarenal abdominal   aortic aneurysm measuring at least 4.6 x 3.9 cm. 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 Left   upper quadrant surgical clips may indicate splenectomy. Correlate with   clinical history. Bilateral cystic renal lesions, incompletely evaluated.   A subcentimeter stone layers dependently within the extrarenal right   pelvis. IVC filter noted.  BONES: Multilevel degenerative changes. Diffuse osteopenia. Multiple   compression deformities within the thoracic spine.    IMPRESSION:    No acute pulmonary embolus.    Significant amount of debris within the mid to distal trachea and within   the segmental airway branches to the right lower lobe concerning for   mucous plugging or aspiration. No dense airspace consolidation, although   scattered areas of tree-in-bud opacities are seen in the bilateral bases,   which may be infectious in etiology.    Partially imaged infrarenal abdominal aortic aneurysm measuring at least   4.6 cm in transverse diameter.    Incompletely imaged subcutaneous lesion within the lateral aspect of the   lower left chest wall.    Findings were discussed by Dr. DONYA Hernandez with Dr. KP Hardin 2017   at 12:30 PM.        EXAM:  US LIVER AND PANCREAS                            PROCEDURE DATE:  2017        INTERPRETATION:  CLINICAL INFORMATION: Transaminitis.    COMPARISON: None available.    TECHNIQUE: Sonography of the right upper quadrant.     FINDINGS:    Liver: Bright echogenic dots on a background of decreased liver   parenchymal echogenicity (starry diane appearance).   Bile ducts: Normal caliber. Common hepatic duct measures 2 mm.   Gallbladder: Multiple small gallstones. No pericholecystic fluid or   gallbladder wall thickening. Sonographic Teixeira's sign was negative.      Pancreas: Not adequately visualized.  Right kidney: 9.4 x 4.3 x 3.5 cm cm. Normal cortical echogenicity and   thickness. No hydronephrosis.  Ascites: None.  IVC: Visualized portions are within normal limits.    IMPRESSION:     Cholelithiasis without sonographic evidence of acute cholecystitis.    Starry diane appearance of the liver. This is nonspecific and can be seen   in the setting of acute hepatitis, fasting, right heartfailure, and   infiltrative disease of the liver.

## 2017-06-12 NOTE — PROGRESS NOTE ADULT - PROBLEM SELECTOR PLAN 1
Improving WBC , reported feeling improvement in SOB   Continue with vancomycin and zosyn   ID follow up   Continue with duonebs , supplement 3L oxygen

## 2017-06-12 NOTE — CONSULT NOTE ADULT - SUBJECTIVE AND OBJECTIVE BOX
Patient is a 85y old  Female who presents with a chief complaint of     Called to see and eval 85 y.o. F w/ PMH significant for b/l DVT on coumadin for hematuria on UA. Pt admitted 17 for SOB, dx with PNA. Routine labs to check for source of infection included UA, which was positive for leukocytes, nitrites, and large blood. No other urological symptoms. Pt denies abd pain, back pain, dysuria, hematuria, hx renal calculi. Pt has had UTI in the past which were treated with abx.    PAST MEDICAL & SURGICAL HISTORY:  DVT (deep venous thrombosis)  Lupus anticoagulant disorder    MEDICATIONS  (STANDING):  tiotropium 18 MICROgram(s) Capsule 1Capsule(s) Inhalation daily  pantoprazole    Tablet 40milliGRAM(s) Oral before breakfast  amiodarone    Tablet 200milliGRAM(s) Oral daily  diltiazem   CD 300milliGRAM(s) Oral daily  latanoprost 0.005% Ophthalmic Solution 1Drop(s) Both EYES at bedtime  sodium chloride 0.9%. 1000milliLiter(s) IV Continuous <Continuous>  ALBUTerol/ipratropium for Nebulization 3milliLiter(s) Nebulizer every 6 hours  piperacillin/tazobactam IVPB. 3.375Gram(s) IV Intermittent every 8 hours  vancomycin  IVPB 750milliGRAM(s) IV Intermittent every 12 hours  warfarin 2milliGRAM(s) Oral once  ALPRAZolam 0.25milliGRAM(s) Oral at bedtime    MEDICATIONS  (PRN):  acetaminophen   Tablet. 650milliGRAM(s) Oral every 6 hours PRN Mild Pain (1 - 3)  ondansetron Injectable 4milliGRAM(s) IV Push every 6 hours PRN Nausea and/or Vomiting      Allergies    No Known Allergies    Intolerances        Vital Signs Last 24 Hrs  T(C): 35.9, Max: 36.8 (06-11 @ 20:40)  T(F): 96.7, Max: 98.2 (06-11 @ 20:40)  HR: 76 (76 - 80)  BP: 103/40 (97/32 - 107/44)  BP(mean): --  RR: 16 (16 - 17)  SpO2: 97% (96% - 99%)    Physical:  Gen: A&Ox3. NAD  Abd: Soft ND, NT  Pelvis: Soft reducible, NT right inguinal hernia noted  Back: no CVAT b/l    I&O's Summary    I & Os for current day (as of 2017 16:09)  =============================================  IN: 0 ml / OUT: 160 ml / NET: -160 ml      LABS:                        10.1   13.6  )-----------( 228      ( 2017 06:32 )             31.6              06-12    138  |  104  |  12  ----------------------------<  82  3.7   |  27  |  0.46<L>    Ca    7.9<L>      2017 06:32  Phos  2.5     06-12  Mg     2.1     06-12    TPro  5.8<L>  /  Alb  1.7<L>  /  TBili  0.7  /  DBili  x   /  AST  171<H>  /  ALT  182<H>  /  AlkPhos  95  06-12            PT/INR - ( 2017 06:32 )   PT: 25.8 sec;   INR: 2.33 ratio         PTT - ( 2017 06:32 )  PTT:44.9 sec  Urinalysis Basic - ( 2017 13:25 )    Color: Yellow / Appearance: Clear / S.010 / pH: x  Gluc: x / Ketone: Negative  / Bili: Negative / Urobili: 4   Blood: x / Protein: 100 / Nitrite: Positive   Leuk Esterase: Moderate / RBC: 25-50 /HPF / WBC 3-5 /HPF   Sq Epi: x / Non Sq Epi: Few / Bacteria: Moderate /HPF

## 2017-06-12 NOTE — PROGRESS NOTE ADULT - SUBJECTIVE AND OBJECTIVE BOX
Patient is a 85y old  Female who presents with a chief complaint of SOB    Called see and eval 85y.o. Female w/PMH significant for b/l DVT on coumadin for hematuria on UA. Pt admitted 17 for SOB, and dx with PNA. Routine labs to check for source of infection included UA which was positive for leukocytes, nitrites, and large blood. No other urologic symptoms. Pt denies abd pain, back pain, dysuria, hematuria, hx renal calculi. Pt has had UTI in the past which were resolved with abx.     PAST MEDICAL & SURGICAL HISTORY:  DVT (deep venous thrombosis)  Lupus anticoagulant disorder      MEDICATIONS  (STANDING):  tiotropium 18 MICROgram(s) Capsule 1Capsule(s) Inhalation daily  pantoprazole    Tablet 40milliGRAM(s) Oral before breakfast  amiodarone    Tablet 200milliGRAM(s) Oral daily  diltiazem   CD 300milliGRAM(s) Oral daily  ALPRAZolam 0.25milliGRAM(s) Oral daily  latanoprost 0.005% Ophthalmic Solution 1Drop(s) Both EYES at bedtime  sodium chloride 0.9%. 1000milliLiter(s) IV Continuous <Continuous>  ALBUTerol/ipratropium for Nebulization 3milliLiter(s) Nebulizer every 6 hours  piperacillin/tazobactam IVPB. 3.375Gram(s) IV Intermittent every 8 hours  vancomycin  IVPB 750milliGRAM(s) IV Intermittent every 12 hours  warfarin 2milliGRAM(s) Oral once    MEDICATIONS  (PRN):  acetaminophen   Tablet. 650milliGRAM(s) Oral every 6 hours PRN Mild Pain (1 - 3)  ondansetron Injectable 4milliGRAM(s) IV Push every 6 hours PRN Nausea and/or Vomiting      Allergies    No Known Allergies    Intolerances        Vital Signs Last 24 Hrs  T(C): 36.2, Max: 36.8 (06-11 @ 20:40)  T(F): 97.1, Max: 98.2 (06-11 @ 20:40)  HR: 78 (76 - 82)  BP: 107/44 (97/32 - 131/53)  BP(mean): --  RR: 16 (16 - 18)  SpO2: 98% (96% - 100%)    Physical:  Gen: A&Ox3. NAD  Abd: Soft ND, NT. Lower midline scar noted  Pelvis: Soft, reducible, nontender right inguinal hernia noted  Back: no CVAT b/l    I&O's Summary    I & Os for current day (as of 2017 12:35)  =============================================  IN: 0 ml / OUT: 160 ml / NET: -160 ml      LABS:                        10.1   13.6  )-----------( 228      ( 2017 06:32 )             31.6              06-12    138  |  104  |  12  ----------------------------<  82  3.7   |  27  |  0.46<L>    Ca    7.9<L>      2017 06:32  Phos  2.5     06-12  Mg     2.1     06-12    TPro  5.8<L>  /  Alb  1.7<L>  /  TBili  0.7  /  DBili  x   /  AST  171<H>  /  ALT  182<H>  /  AlkPhos  95  06-12            PT/INR - ( 2017 06:32 )   PT: 25.8 sec;   INR: 2.33 ratio         PTT - ( 2017 06:32 )  PTT:44.9 sec  Urinalysis Basic - ( 2017 13:25 )    Color: Yellow / Appearance: Clear / S.010 / pH: x  Gluc: x / Ketone: Negative  / Bili: Negative / Urobili: 4   Blood: x / Protein: 100 / Nitrite: Positive   Leuk Esterase: Moderate / RBC: 25-50 /HPF / WBC 3-5 /HPF   Sq Epi: x / Non Sq Epi: Few / Bacteria: Moderate /HPF

## 2017-06-12 NOTE — CONSULT NOTE ADULT - PROBLEM SELECTOR RECOMMENDATION 4
1- monitor Blood pressure closely.  2-Blood pressure control.  3- BP. meds as per cardiology and primary care team.

## 2017-06-12 NOTE — CONSULT NOTE ADULT - SUBJECTIVE AND OBJECTIVE BOX
PULMONARY CONSULT NOTE      MARS AIKEN  MRN-[id number]    Patient is a 85y old  Female who presents with a chief complaint of malaise, cough, sob, runny nose x2 days. Cough productive of thick yellowish sputum, 1 tbsp each time. Subjective fever and chills.  Sick contacts: 1 grand child with URI.  Former smoker, 1 pack/day/20 years. Quit 20 years ago.  Pt has h/o COPD on home oxygen 3 lts.    HISTORY OF PRESENT ILLNESS:    MEDICATIONS  (STANDING):  tiotropium 18 MICROgram(s) Capsule 1Capsule(s) Inhalation daily  pantoprazole    Tablet 40milliGRAM(s) Oral before breakfast  amiodarone    Tablet 200milliGRAM(s) Oral daily  diltiazem   CD 300milliGRAM(s) Oral daily  ALPRAZolam 0.25milliGRAM(s) Oral daily  latanoprost 0.005% Ophthalmic Solution 1Drop(s) Both EYES at bedtime  sodium chloride 0.9%. 1000milliLiter(s) IV Continuous <Continuous>  ALBUTerol/ipratropium for Nebulization 3milliLiter(s) Nebulizer every 6 hours  piperacillin/tazobactam IVPB. 3.375Gram(s) IV Intermittent every 8 hours  vancomycin  IVPB 750milliGRAM(s) IV Intermittent every 12 hours  warfarin 2milliGRAM(s) Oral once      MEDICATIONS  (PRN):  acetaminophen   Tablet. 650milliGRAM(s) Oral every 6 hours PRN Mild Pain (1 - 3)  ondansetron Injectable 4milliGRAM(s) IV Push every 6 hours PRN Nausea and/or Vomiting      Allergies    No Known Allergies    Intolerances        PAST MEDICAL & SURGICAL HISTORY:  DVT (deep venous thrombosis)  Lupus anticoagulant disorder  No significant past surgical history      FAMILY HISTORY:      SOCIAL HISTORY  Smoking History: Former smoker, 1 pack/day/20 years. Quit 20 years ago.    REVIEW OF SYSTEMS:    CONSTITUTIONAL:  No fevers, chills, sweats    HEENT:  Eyes:  No diplopia or blurred vision. ENT:  No earache, sore throat or runny nose.    CARDIOVASCULAR:  No pressure, squeezing, tightness, or heaviness about the chest; no palpitations.    RESPIRATORY:  Per HPI    GASTROINTESTINAL:  No abdominal pain, nausea, vomiting or diarrhea.    GENITOURINARY:  No dysuria, frequency or urgency.    NEUROLOGIC:  No paresthesias, fasciculations, seizures or weakness.    PSYCHIATRIC:  No disorder of thought or mood.    Vital Signs Last 24 Hrs  T(C): 36.2, Max: 36.8 ( @ 20:40)  T(F): 97.1, Max: 98.2 ( @ 20:40)  HR: 78 (76 - 82)  BP: 107/44 (97/32 - 131/53)  BP(mean): --  RR: 16 (16 - 18)  SpO2: 98% (96% - 100%)  I&O's Detail    I & Os for current day (as of 2017 10:22)  =============================================  IN:    Total IN: 0 ml  ---------------------------------------------  OUT:    Voided: 160 ml    Total OUT: 160 ml  ---------------------------------------------  Total NET: -160 ml      PHYSICAL EXAMINATION:    GENERAL: The patient is a well-developed, well-nourished _____in no apparent distress.     HEENT: Head is normocephalic and atraumatic. Extraocular muscles are intact. Mucous membranes are moist.     NECK: Supple.     LUNGS: Clear to auscultation without wheezing, rales, or rhonchi. Respirations unlabored    HEART: Regular rate and rhythm without murmur.    ABDOMEN: Soft, nontender, and nondistended.  No hepatosplenomegaly is noted.    EXTREMITIES: Without any cyanosis, clubbing, rash, lesions or edema.    NEUROLOGIC: Grossly intact.      LABS:                        10.1   13.6  )-----------( 228      ( 2017 06:32 )             31.6     06-12    138  |  104  |  12  ----------------------------<  82  3.7   |  27  |  0.46<L>    Ca    7.9<L>      2017 06:32  Phos  2.5     06-12  Mg     2.1     06-12    TPro  5.8<L>  /  Alb  1.7<L>  /  TBili  0.7  /  DBili  x   /  AST  171<H>  /  ALT  182<H>  /  AlkPhos  95  06-12    PT/INR - ( 2017 06:32 )   PT: 25.8 sec;   INR: 2.33 ratio         PTT - ( 2017 06:32 )  PTT:44.9 sec  Urinalysis Basic - ( 2017 13:25 )    Color: Yellow / Appearance: Clear / S.010 / pH: x  Gluc: x / Ketone: Negative  / Bili: Negative / Urobili: 4   Blood: x / Protein: 100 / Nitrite: Positive   Leuk Esterase: Moderate / RBC: 25-50 /HPF / WBC 3-5 /HPF   Sq Epi: x / Non Sq Epi: Few / Bacteria: Moderate /HPF        CARDIAC MARKERS ( 2017 11:14 )  <0.015 ng/mL / x     / x     / x     / x            Serum Pro-Brain Natriuretic Peptide: 1482 pg/mL ( @ 12:32)          MICROBIOLOGY:    RADIOLOGY & ADDITIONAL STUDIES:    CXR: not available for review      Ct scan angio chest:      LUNGS AND LARGE AIRWAYS: Significant amount of debris within the mid to   distal trachea and within the segmental airway branches to the right   lower lobe concerning for mucous plugging or aspiration. No dense   airspace consolidation, although scattered areas of tree-in-bud opacities   are seen in the bilateral bases. There is moderate to severe   centrilobular emphysematous changes.  PLEURA: Small right pleural effusion.  VESSELS: Atherosclerotic changes of the thoracic aorta without aneurysm.   No acute pulmonary embolus.  HEART: Heart size is normal. No pericardial effusion.  MEDIASTINUM AND ANDREAS: No lymphadenopathy.  CHEST WALL AND AXILLA: Incompletely imaged is a 4.2 x 1.7 cm subcutaneous   lesion within the lateral aspect of the lower left chest wall   (3:110-1:15) containing at least two types of soft tissue internally.  VISUALIZED UPPER ABDOMEN: Partially imaged is a infrarenal abdominal   aortic aneurysm measuring at least 4.6 x 3.9 cm. 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 Left   upper quadrant surgical clips may indicate splenectomy. Correlate with   clinical history. Bilateral cystic renal lesions, incompletely evaluated.   A subcentimeter stone layers dependently within the extrarenal right   pelvis. IVC filter noted.  BONES: Multilevel degenerative changes. Diffuse osteopenia. Multiple   compression deformities within the thoracic spine.    IMPRESSION:    No acute pulmonary embolus.    Significant amount of debris within the mid to distal trachea and within   the segmental airway branches to the right lower lobe concerning for   mucous plugging or aspiration. No dense airspace consolidation, although   scattered areas of tree-in-bud opacities are seen in the bilateral bases,   which may be infectious in etiology.    Partially imaged infrarenal abdominal aortic aneurysm measuring at least   4.6 cm in transverse diameter.    Incompletely imaged subcutaneous lesion within the lateral aspect of the   lower left chest wall.    Findings were discussed by Dr. DONYA Hernandez with Dr. KP Sanchez on 2017   at 12:30 PM    ekg; Not available for review.      echo: Not available for review. PULMONARY CONSULT NOTE      MARS AIKEN  MRN-[id number]    Patient is a 85y old  Female who presents with a chief complaint of malaise, cough, sob, runny nose x2 days. Cough productive of thick yellowish sputum, 1 tbsp each time. Subjective fever and chills.  Sick contacts: 1 grand child with URI.  Former smoker, 1 pack/day/20 years. Quit 20 years ago.  Pt has h/o COPD on home oxygen 3 lts.    HISTORY OF PRESENT ILLNESS: as above    MEDICATIONS  (STANDING):  tiotropium 18 MICROgram(s) Capsule 1Capsule(s) Inhalation daily  pantoprazole    Tablet 40milliGRAM(s) Oral before breakfast  amiodarone    Tablet 200milliGRAM(s) Oral daily  diltiazem   CD 300milliGRAM(s) Oral daily  ALPRAZolam 0.25milliGRAM(s) Oral daily  latanoprost 0.005% Ophthalmic Solution 1Drop(s) Both EYES at bedtime  sodium chloride 0.9%. 1000milliLiter(s) IV Continuous <Continuous>  ALBUTerol/ipratropium for Nebulization 3milliLiter(s) Nebulizer every 6 hours  piperacillin/tazobactam IVPB. 3.375Gram(s) IV Intermittent every 8 hours  vancomycin  IVPB 750milliGRAM(s) IV Intermittent every 12 hours  warfarin 2milliGRAM(s) Oral once      MEDICATIONS  (PRN):  acetaminophen   Tablet. 650milliGRAM(s) Oral every 6 hours PRN Mild Pain (1 - 3)  ondansetron Injectable 4milliGRAM(s) IV Push every 6 hours PRN Nausea and/or Vomiting      Allergies    No Known Allergies    Intolerances        PAST MEDICAL & SURGICAL HISTORY:  DVT (deep venous thrombosis)  Lupus anticoagulant disorder  No significant past surgical history      FAMILY HISTORY:      SOCIAL HISTORY  Smoking History: Former smoker, 1 pack/day/20 years. Quit 20 years ago.    REVIEW OF SYSTEMS:    CONSTITUTIONAL:  No fevers, chills, sweats    HEENT:  Eyes:  No diplopia or blurred vision. ENT:  No earache, sore throat or runny nose.    CARDIOVASCULAR:  No pressure, squeezing, tightness, or heaviness about the chest; no palpitations.    RESPIRATORY:  Per HPI    GASTROINTESTINAL:  No abdominal pain, nausea, vomiting or diarrhea.    GENITOURINARY:  No dysuria, frequency or urgency.    NEUROLOGIC:  No paresthesias, fasciculations, seizures or weakness.    PSYCHIATRIC:  No disorder of thought or mood.    Vital Signs Last 24 Hrs  T(C): 36.2, Max: 36.8 ( @ 20:40)  T(F): 97.1, Max: 98.2 ( @ 20:40)  HR: 78 (76 - 82)  BP: 107/44 (97/32 - 131/53)  BP(mean): --  RR: 16 (16 - 18)  SpO2: 98% (96% - 100%)  I&O's Detail    I & Os for current day (as of 2017 10:22)  =============================================  IN:    Total IN: 0 ml  ---------------------------------------------  OUT:    Voided: 160 ml    Total OUT: 160 ml  ---------------------------------------------  Total NET: -160 ml      PHYSICAL EXAMINATION:    GENERAL: The patient is a well-developed, well-nourished _____in no apparent distress.     HEENT: Head is normocephalic and atraumatic. Extraocular muscles are intact. Mucous membranes are moist.     NECK: Supple.     LUNGS: Clear to auscultation without wheezing, rales, or rhonchi. Respirations unlabored    HEART: Regular rate and rhythm without murmur.    ABDOMEN: Soft, nontender, and nondistended.  No hepatosplenomegaly is noted.    EXTREMITIES: Without any cyanosis, clubbing, rash, lesions or edema.    NEUROLOGIC: Grossly intact.      LABS:                        10.1   13.6  )-----------( 228      ( 2017 06:32 )             31.6     06-12    138  |  104  |  12  ----------------------------<  82  3.7   |  27  |  0.46<L>    Ca    7.9<L>      2017 06:32  Phos  2.5     06-12  Mg     2.1     06-12    TPro  5.8<L>  /  Alb  1.7<L>  /  TBili  0.7  /  DBili  x   /  AST  171<H>  /  ALT  182<H>  /  AlkPhos  95  06-12    PT/INR - ( 2017 06:32 )   PT: 25.8 sec;   INR: 2.33 ratio         PTT - ( 2017 06:32 )  PTT:44.9 sec  Urinalysis Basic - ( 2017 13:25 )    Color: Yellow / Appearance: Clear / S.010 / pH: x  Gluc: x / Ketone: Negative  / Bili: Negative / Urobili: 4   Blood: x / Protein: 100 / Nitrite: Positive   Leuk Esterase: Moderate / RBC: 25-50 /HPF / WBC 3-5 /HPF   Sq Epi: x / Non Sq Epi: Few / Bacteria: Moderate /HPF        CARDIAC MARKERS ( 2017 11:14 )  <0.015 ng/mL / x     / x     / x     / x            Serum Pro-Brain Natriuretic Peptide: 1482 pg/mL ( @ 12:32)          MICROBIOLOGY:    RADIOLOGY & ADDITIONAL STUDIES:    CXR: not available for review      Ct scan angio chest:      LUNGS AND LARGE AIRWAYS: Significant amount of debris within the mid to   distal trachea and within the segmental airway branches to the right   lower lobe concerning for mucous plugging or aspiration. No dense   airspace consolidation, although scattered areas of tree-in-bud opacities   are seen in the bilateral bases. There is moderate to severe   centrilobular emphysematous changes.  PLEURA: Small right pleural effusion.  VESSELS: Atherosclerotic changes of the thoracic aorta without aneurysm.   No acute pulmonary embolus.  HEART: Heart size is normal. No pericardial effusion.  MEDIASTINUM AND ANDREAS: No lymphadenopathy.  CHEST WALL AND AXILLA: Incompletely imaged is a 4.2 x 1.7 cm subcutaneous   lesion within the lateral aspect of the lower left chest wall   (3:110-1:15) containing at least two types of soft tissue internally.  VISUALIZED UPPER ABDOMEN: Partially imaged is a infrarenal abdominal   aortic aneurysm measuring at least 4.6 x 3.9 cm. 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 Left   upper quadrant surgical clips may indicate splenectomy. Correlate with   clinical history. Bilateral cystic renal lesions, incompletely evaluated.   A subcentimeter stone layers dependently within the extrarenal right   pelvis. IVC filter noted.  BONES: Multilevel degenerative changes. Diffuse osteopenia. Multiple   compression deformities within the thoracic spine.    IMPRESSION:    No acute pulmonary embolus.    Significant amount of debris within the mid to distal trachea and within   the segmental airway branches to the right lower lobe concerning for   mucous plugging or aspiration. No dense airspace consolidation, although   scattered areas of tree-in-bud opacities are seen in the bilateral bases,   which may be infectious in etiology.    Partially imaged infrarenal abdominal aortic aneurysm measuring at least   4.6 cm in transverse diameter.    Incompletely imaged subcutaneous lesion within the lateral aspect of the   lower left chest wall.    Findings were discussed by Dr. DONYA Hernandez with Dr. KP Sanchez on 2017   at 12:30 PM    ekg; Not available for review.      echo: Not available for review.

## 2017-06-12 NOTE — ADVANCED PRACTICE NURSE CONSULT - ASSESSMENT
This is a 85yr old female patient admitted for Pneumonitis, presenting with blanchable erythema to the Bilateral Heels and hyperpigmentation to the Sacrum.

## 2017-06-12 NOTE — CONSULT NOTE ADULT - PROBLEM SELECTOR RECOMMENDATION 3
1- monitor heart rate closely.  2-Blood pressure control.  3- Cardiac meds as per cardiology and primary care team.

## 2017-06-12 NOTE — CONSULT NOTE ADULT - PROBLEM SELECTOR RECOMMENDATION 9
Proteinuria with normal renal function  likely in the setting of infection (PNA/ UTI)/ microscopic hematuria:   Check urine pr/Cr to quantify proteinuria  Repeat UA when infection cleared. Proteinuria with normal renal function  likely in the setting of infection (PNA/ UTI)/ microscopic hematuria:   Check urine pr/Cr to quantify proteinuria. Check SIFE.  Pt with neg HepBsAg and neg HepC.   Repeat UA when infection cleared.

## 2017-06-12 NOTE — PROGRESS NOTE ADULT - PROBLEM SELECTOR PLAN 2
liver enzyme trending down   US liver showed Cholelithiasis without sonographic evidence of acute cholecystitis.    Starry diane appearance of the liver. This is nonspecific and can be seen   in the setting of acute hepatitis, fasting, right heartfailure, and   infiltrative disease of the liver.

## 2017-06-12 NOTE — CONSULT NOTE ADULT - PROBLEM SELECTOR RECOMMENDATION 9
complete abx course  f/u urine cx  rec CT abd/pelv with contrast to r/o other source of hematuria  case d/w Dr. Vela

## 2017-06-13 DIAGNOSIS — J69.0 PNEUMONITIS DUE TO INHALATION OF FOOD AND VOMIT: ICD-10-CM

## 2017-06-13 DIAGNOSIS — J96.01 ACUTE RESPIRATORY FAILURE WITH HYPOXIA: ICD-10-CM

## 2017-06-13 DIAGNOSIS — I95.89 OTHER HYPOTENSION: ICD-10-CM

## 2017-06-13 DIAGNOSIS — J96.20 ACUTE AND CHRONIC RESPIRATORY FAILURE, UNSPECIFIED WHETHER WITH HYPOXIA OR HYPERCAPNIA: ICD-10-CM

## 2017-06-13 DIAGNOSIS — A41.9 SEPSIS, UNSPECIFIED ORGANISM: ICD-10-CM

## 2017-06-13 DIAGNOSIS — I82.409 ACUTE EMBOLISM AND THROMBOSIS OF UNSPECIFIED DEEP VEINS OF UNSPECIFIED LOWER EXTREMITY: ICD-10-CM

## 2017-06-13 DIAGNOSIS — Z51.5 ENCOUNTER FOR PALLIATIVE CARE: ICD-10-CM

## 2017-06-13 DIAGNOSIS — R53.81 OTHER MALAISE: ICD-10-CM

## 2017-06-13 DIAGNOSIS — R62.7 ADULT FAILURE TO THRIVE: ICD-10-CM

## 2017-06-13 LAB
ALBUMIN SERPL ELPH-MCNC: 1.7 G/DL — LOW (ref 3.5–5)
ALP SERPL-CCNC: 96 U/L — SIGNIFICANT CHANGE UP (ref 40–120)
ALT FLD-CCNC: 187 U/L DA — HIGH (ref 10–60)
ANION GAP SERPL CALC-SCNC: 13 MMOL/L — SIGNIFICANT CHANGE UP (ref 5–17)
ANION GAP SERPL CALC-SCNC: 9 MMOL/L — SIGNIFICANT CHANGE UP (ref 5–17)
APTT BLD: 48 SEC — HIGH (ref 27.5–37.4)
APTT BLD: 48.5 SEC — HIGH (ref 27.5–37.4)
AST SERPL-CCNC: 182 U/L — HIGH (ref 10–40)
BASE EXCESS BLDA CALC-SCNC: -4.5 MMOL/L — LOW (ref -2–2)
BASE EXCESS BLDA CALC-SCNC: -7 MMOL/L — LOW (ref -2–2)
BASE EXCESS BLDA CALC-SCNC: 0.2 MMOL/L — SIGNIFICANT CHANGE UP (ref -2–2)
BASOPHILS # BLD AUTO: 0.1 K/UL — SIGNIFICANT CHANGE UP (ref 0–0.2)
BASOPHILS NFR BLD AUTO: 0.5 % — SIGNIFICANT CHANGE UP (ref 0–2)
BILIRUB SERPL-MCNC: 0.6 MG/DL — SIGNIFICANT CHANGE UP (ref 0.2–1.2)
BLOOD GAS COMMENTS ARTERIAL: SIGNIFICANT CHANGE UP
BLOOD GAS COMMENTS ARTERIAL: SIGNIFICANT CHANGE UP
BUN SERPL-MCNC: 17 MG/DL — SIGNIFICANT CHANGE UP (ref 7–18)
BUN SERPL-MCNC: 23 MG/DL — HIGH (ref 7–18)
CALCIUM SERPL-MCNC: 7.7 MG/DL — LOW (ref 8.4–10.5)
CALCIUM SERPL-MCNC: 8.2 MG/DL — LOW (ref 8.4–10.5)
CHLORIDE SERPL-SCNC: 102 MMOL/L — SIGNIFICANT CHANGE UP (ref 96–108)
CHLORIDE SERPL-SCNC: 104 MMOL/L — SIGNIFICANT CHANGE UP (ref 96–108)
CK MB BLD-MCNC: 3.1 % — SIGNIFICANT CHANGE UP (ref 0–3.5)
CK MB BLD-MCNC: <3 % — SIGNIFICANT CHANGE UP (ref 0–3.5)
CK MB CFR SERPL CALC: 1.1 NG/ML — SIGNIFICANT CHANGE UP (ref 0–3.6)
CK MB CFR SERPL CALC: <1 NG/ML — SIGNIFICANT CHANGE UP (ref 0–3.6)
CK SERPL-CCNC: 25 U/L — SIGNIFICANT CHANGE UP (ref 21–215)
CK SERPL-CCNC: 33 U/L — SIGNIFICANT CHANGE UP (ref 21–215)
CK SERPL-CCNC: 36 U/L — SIGNIFICANT CHANGE UP (ref 21–215)
CO2 SERPL-SCNC: 20 MMOL/L — LOW (ref 22–31)
CO2 SERPL-SCNC: 26 MMOL/L — SIGNIFICANT CHANGE UP (ref 22–31)
CREAT SERPL-MCNC: 0.58 MG/DL — SIGNIFICANT CHANGE UP (ref 0.5–1.3)
CREAT SERPL-MCNC: 1.09 MG/DL — SIGNIFICANT CHANGE UP (ref 0.5–1.3)
EOSINOPHIL # BLD AUTO: 0 K/UL — SIGNIFICANT CHANGE UP (ref 0–0.5)
EOSINOPHIL NFR BLD AUTO: 0 % — SIGNIFICANT CHANGE UP (ref 0–6)
GLUCOSE SERPL-MCNC: 104 MG/DL — HIGH (ref 70–99)
GLUCOSE SERPL-MCNC: 176 MG/DL — HIGH (ref 70–99)
GRAM STN FLD: SIGNIFICANT CHANGE UP
HCO3 BLDA-SCNC: 17 MMOL/L — LOW (ref 23–27)
HCO3 BLDA-SCNC: 24 MMOL/L — SIGNIFICANT CHANGE UP (ref 23–27)
HCO3 BLDA-SCNC: 29 MMOL/L — HIGH (ref 23–27)
HCT VFR BLD CALC: 33.5 % — LOW (ref 34.5–45)
HCT VFR BLD CALC: 35.9 % — SIGNIFICANT CHANGE UP (ref 34.5–45)
HGB BLD-MCNC: 10.5 G/DL — LOW (ref 11.5–15.5)
HGB BLD-MCNC: 11.3 G/DL — LOW (ref 11.5–15.5)
HOROWITZ INDEX BLDA+IHG-RTO: 100 — SIGNIFICANT CHANGE UP
HOROWITZ INDEX BLDA+IHG-RTO: 35 — SIGNIFICANT CHANGE UP
HOROWITZ INDEX BLDA+IHG-RTO: SIGNIFICANT CHANGE UP
INR BLD: 3.01 RATIO — HIGH (ref 0.88–1.16)
INR BLD: 3.57 RATIO — HIGH (ref 0.88–1.16)
LACTATE SERPL-SCNC: 1 MMOL/L — SIGNIFICANT CHANGE UP (ref 0.7–2)
LYMPHOCYTES # BLD AUTO: 0.8 K/UL — LOW (ref 1–3.3)
LYMPHOCYTES # BLD AUTO: 1 % — LOW (ref 13–44)
LYMPHOCYTES # BLD AUTO: 5.2 % — LOW (ref 13–44)
MAGNESIUM SERPL-MCNC: 1.9 MG/DL — SIGNIFICANT CHANGE UP (ref 1.6–2.6)
MAGNESIUM SERPL-MCNC: 2.1 MG/DL — SIGNIFICANT CHANGE UP (ref 1.6–2.6)
MCHC RBC-ENTMCNC: 31.3 GM/DL — LOW (ref 32–36)
MCHC RBC-ENTMCNC: 31.6 GM/DL — LOW (ref 32–36)
MCHC RBC-ENTMCNC: 31.8 PG — SIGNIFICANT CHANGE UP (ref 27–34)
MCHC RBC-ENTMCNC: 32.3 PG — SIGNIFICANT CHANGE UP (ref 27–34)
MCV RBC AUTO: 101.9 FL — HIGH (ref 80–100)
MCV RBC AUTO: 102.4 FL — HIGH (ref 80–100)
MONOCYTES # BLD AUTO: 1.5 K/UL — HIGH (ref 0–0.9)
MONOCYTES NFR BLD AUTO: 4 % — SIGNIFICANT CHANGE UP (ref 2–14)
MONOCYTES NFR BLD AUTO: 9.9 % — SIGNIFICANT CHANGE UP (ref 2–14)
NEUTROPHILS # BLD AUTO: 12.6 K/UL — HIGH (ref 1.8–7.4)
NEUTROPHILS NFR BLD AUTO: 84.2 % — HIGH (ref 43–77)
NEUTROPHILS NFR BLD AUTO: 95 % — HIGH (ref 43–77)
PCO2 BLDA: 33 MMHG — SIGNIFICANT CHANGE UP (ref 32–46)
PCO2 BLDA: 65 MMHG — HIGH (ref 32–46)
PCO2 BLDA: 77 MMHG — CRITICAL HIGH (ref 32–46)
PH BLDA: 7.19 — CRITICAL LOW (ref 7.35–7.45)
PH BLDA: 7.2 — CRITICAL LOW (ref 7.35–7.45)
PH BLDA: 7.35 — SIGNIFICANT CHANGE UP (ref 7.35–7.45)
PHOSPHATE SERPL-MCNC: 2.8 MG/DL — SIGNIFICANT CHANGE UP (ref 2.5–4.5)
PHOSPHATE SERPL-MCNC: 3.5 MG/DL — SIGNIFICANT CHANGE UP (ref 2.5–4.5)
PLATELET # BLD AUTO: 251 K/UL — SIGNIFICANT CHANGE UP (ref 150–400)
PLATELET # BLD AUTO: 311 K/UL — SIGNIFICANT CHANGE UP (ref 150–400)
PO2 BLDA: 445 MMHG — HIGH (ref 74–108)
PO2 BLDA: 60 MMHG — LOW (ref 74–108)
PO2 BLDA: 88 MMHG — SIGNIFICANT CHANGE UP (ref 74–108)
POTASSIUM SERPL-MCNC: 4.1 MMOL/L — SIGNIFICANT CHANGE UP (ref 3.5–5.3)
POTASSIUM SERPL-MCNC: 4.2 MMOL/L — SIGNIFICANT CHANGE UP (ref 3.5–5.3)
POTASSIUM SERPL-SCNC: 4.1 MMOL/L — SIGNIFICANT CHANGE UP (ref 3.5–5.3)
POTASSIUM SERPL-SCNC: 4.2 MMOL/L — SIGNIFICANT CHANGE UP (ref 3.5–5.3)
PROT SERPL-MCNC: 5.8 G/DL — LOW (ref 6–8.3)
PROTHROM AB SERPL-ACNC: 33.6 SEC — HIGH (ref 9.8–12.7)
PROTHROM AB SERPL-ACNC: 39.9 SEC — HIGH (ref 9.8–12.7)
RBC # BLD: 3.28 M/UL — LOW (ref 3.8–5.2)
RBC # BLD: 3.51 M/UL — LOW (ref 3.8–5.2)
RBC # FLD: 15.3 % — HIGH (ref 10.3–14.5)
RBC # FLD: 15.3 % — HIGH (ref 10.3–14.5)
SAO2 % BLDA: 88 % — LOW (ref 92–96)
SAO2 % BLDA: 97 % — HIGH (ref 92–96)
SAO2 % BLDA: >98 % — HIGH (ref 92–96)
SODIUM SERPL-SCNC: 137 MMOL/L — SIGNIFICANT CHANGE UP (ref 135–145)
SODIUM SERPL-SCNC: 137 MMOL/L — SIGNIFICANT CHANGE UP (ref 135–145)
SPECIMEN SOURCE: SIGNIFICANT CHANGE UP
TROPONIN I SERPL-MCNC: 0.03 NG/ML — SIGNIFICANT CHANGE UP (ref 0–0.04)
TROPONIN I SERPL-MCNC: 0.09 NG/ML — HIGH (ref 0–0.04)
TROPONIN I SERPL-MCNC: <0.015 NG/ML — SIGNIFICANT CHANGE UP (ref 0–0.04)
WBC # BLD: 14.9 K/UL — HIGH (ref 3.8–10.5)
WBC # BLD: 19.8 K/UL — HIGH (ref 3.8–10.5)
WBC # FLD AUTO: 14.9 K/UL — HIGH (ref 3.8–10.5)
WBC # FLD AUTO: 19.8 K/UL — HIGH (ref 3.8–10.5)

## 2017-06-13 PROCEDURE — 99291 CRITICAL CARE FIRST HOUR: CPT | Mod: 25

## 2017-06-13 PROCEDURE — 71010: CPT | Mod: 26,77

## 2017-06-13 PROCEDURE — 99223 1ST HOSP IP/OBS HIGH 75: CPT

## 2017-06-13 PROCEDURE — 31500 INSERT EMERGENCY AIRWAY: CPT | Mod: GC

## 2017-06-13 PROCEDURE — 93970 EXTREMITY STUDY: CPT | Mod: 26

## 2017-06-13 PROCEDURE — 71010: CPT | Mod: 26

## 2017-06-13 PROCEDURE — 99292 CRITICAL CARE ADDL 30 MIN: CPT | Mod: 25

## 2017-06-13 PROCEDURE — 36556 INSERT NON-TUNNEL CV CATH: CPT | Mod: GC

## 2017-06-13 RX ORDER — PHENYLEPHRINE HYDROCHLORIDE 10 MG/ML
0.5 INJECTION INTRAVENOUS
Qty: 160 | Refills: 0 | Status: DISCONTINUED | OUTPATIENT
Start: 2017-06-13 | End: 2017-06-13

## 2017-06-13 RX ORDER — ALBUTEROL 90 UG/1
2 AEROSOL, METERED ORAL EVERY 6 HOURS
Qty: 0 | Refills: 0 | Status: DISCONTINUED | OUTPATIENT
Start: 2017-06-13 | End: 2017-06-15

## 2017-06-13 RX ORDER — DEXMEDETOMIDINE HYDROCHLORIDE IN 0.9% SODIUM CHLORIDE 4 UG/ML
0.4 INJECTION INTRAVENOUS
Qty: 400 | Refills: 0 | Status: DISCONTINUED | OUTPATIENT
Start: 2017-06-13 | End: 2017-06-15

## 2017-06-13 RX ORDER — ACETAMINOPHEN 500 MG
650 TABLET ORAL EVERY 6 HOURS
Qty: 0 | Refills: 0 | Status: DISCONTINUED | OUTPATIENT
Start: 2017-06-13 | End: 2017-06-13

## 2017-06-13 RX ORDER — ASPIRIN/CALCIUM CARB/MAGNESIUM 324 MG
325 TABLET ORAL DAILY
Qty: 0 | Refills: 0 | Status: DISCONTINUED | OUTPATIENT
Start: 2017-06-13 | End: 2017-06-14

## 2017-06-13 RX ORDER — PANTOPRAZOLE SODIUM 20 MG/1
40 TABLET, DELAYED RELEASE ORAL DAILY
Qty: 0 | Refills: 0 | Status: DISCONTINUED | OUTPATIENT
Start: 2017-06-13 | End: 2017-06-14

## 2017-06-13 RX ORDER — PHENYLEPHRINE HYDROCHLORIDE 10 MG/ML
0.5 INJECTION INTRAVENOUS
Qty: 160 | Refills: 0 | Status: DISCONTINUED | OUTPATIENT
Start: 2017-06-13 | End: 2017-06-15

## 2017-06-13 RX ORDER — MIDAZOLAM HYDROCHLORIDE 1 MG/ML
2 INJECTION, SOLUTION INTRAMUSCULAR; INTRAVENOUS ONCE
Qty: 0 | Refills: 0 | Status: DISCONTINUED | OUTPATIENT
Start: 2017-06-13 | End: 2017-06-13

## 2017-06-13 RX ORDER — FENTANYL CITRATE 50 UG/ML
100 INJECTION INTRAVENOUS ONCE
Qty: 0 | Refills: 0 | Status: DISCONTINUED | OUTPATIENT
Start: 2017-06-13 | End: 2017-06-13

## 2017-06-13 RX ORDER — BUDESONIDE AND FORMOTEROL FUMARATE DIHYDRATE 160; 4.5 UG/1; UG/1
2 AEROSOL RESPIRATORY (INHALATION)
Qty: 0 | Refills: 0 | Status: DISCONTINUED | OUTPATIENT
Start: 2017-06-13 | End: 2017-06-13

## 2017-06-13 RX ORDER — SODIUM CHLORIDE 9 MG/ML
1000 INJECTION, SOLUTION INTRAVENOUS
Qty: 0 | Refills: 0 | Status: DISCONTINUED | OUTPATIENT
Start: 2017-06-13 | End: 2017-06-14

## 2017-06-13 RX ORDER — NOREPINEPHRINE BITARTRATE/D5W 8 MG/250ML
0.5 PLASTIC BAG, INJECTION (ML) INTRAVENOUS
Qty: 16 | Refills: 0 | Status: DISCONTINUED | OUTPATIENT
Start: 2017-06-13 | End: 2017-06-13

## 2017-06-13 RX ORDER — FUROSEMIDE 40 MG
40 TABLET ORAL ONCE
Qty: 0 | Refills: 0 | Status: COMPLETED | OUTPATIENT
Start: 2017-06-13 | End: 2017-06-13

## 2017-06-13 RX ORDER — SODIUM CHLORIDE 9 MG/ML
2000 INJECTION INTRAMUSCULAR; INTRAVENOUS; SUBCUTANEOUS ONCE
Qty: 0 | Refills: 0 | Status: COMPLETED | OUTPATIENT
Start: 2017-06-13 | End: 2017-06-13

## 2017-06-13 RX ORDER — ACETAMINOPHEN 500 MG
650 TABLET ORAL
Qty: 0 | Refills: 0 | Status: DISCONTINUED | OUTPATIENT
Start: 2017-06-13 | End: 2017-06-15

## 2017-06-13 RX ORDER — FENTANYL CITRATE 50 UG/ML
50 INJECTION INTRAVENOUS EVERY 6 HOURS
Qty: 0 | Refills: 0 | Status: DISCONTINUED | OUTPATIENT
Start: 2017-06-13 | End: 2017-06-15

## 2017-06-13 RX ORDER — ATORVASTATIN CALCIUM 80 MG/1
40 TABLET, FILM COATED ORAL DAILY
Qty: 0 | Refills: 0 | Status: DISCONTINUED | OUTPATIENT
Start: 2017-06-13 | End: 2017-06-14

## 2017-06-13 RX ADMIN — Medication 3 MILLILITER(S): at 02:00

## 2017-06-13 RX ADMIN — Medication 150 MILLIGRAM(S): at 17:15

## 2017-06-13 RX ADMIN — Medication 650 MILLIGRAM(S): at 20:20

## 2017-06-13 RX ADMIN — LATANOPROST 1 DROP(S): 0.05 SOLUTION/ DROPS OPHTHALMIC; TOPICAL at 23:47

## 2017-06-13 RX ADMIN — FENTANYL CITRATE 50 MICROGRAM(S): 50 INJECTION INTRAVENOUS at 21:47

## 2017-06-13 RX ADMIN — Medication 650 MILLIGRAM(S): at 16:15

## 2017-06-13 RX ADMIN — DEXMEDETOMIDINE HYDROCHLORIDE IN 0.9% SODIUM CHLORIDE 4.31 MICROGRAM(S)/KG/HR: 4 INJECTION INTRAVENOUS at 04:00

## 2017-06-13 RX ADMIN — PIPERACILLIN AND TAZOBACTAM 25 GRAM(S): 4; .5 INJECTION, POWDER, LYOPHILIZED, FOR SOLUTION INTRAVENOUS at 21:43

## 2017-06-13 RX ADMIN — MIDAZOLAM HYDROCHLORIDE 2 MILLIGRAM(S): 1 INJECTION, SOLUTION INTRAMUSCULAR; INTRAVENOUS at 03:29

## 2017-06-13 RX ADMIN — PHENYLEPHRINE HYDROCHLORIDE 4.13 MICROGRAM(S)/KG/MIN: 10 INJECTION INTRAVENOUS at 21:58

## 2017-06-13 RX ADMIN — FENTANYL CITRATE 100 MICROGRAM(S): 50 INJECTION INTRAVENOUS at 05:31

## 2017-06-13 RX ADMIN — Medication 20.2 MICROGRAM(S)/KG/MIN: at 10:22

## 2017-06-13 RX ADMIN — PIPERACILLIN AND TAZOBACTAM 25 GRAM(S): 4; .5 INJECTION, POWDER, LYOPHILIZED, FOR SOLUTION INTRAVENOUS at 05:42

## 2017-06-13 RX ADMIN — Medication 150 MILLIGRAM(S): at 05:54

## 2017-06-13 RX ADMIN — DEXMEDETOMIDINE HYDROCHLORIDE IN 0.9% SODIUM CHLORIDE 4.31 MICROGRAM(S)/KG/HR: 4 INJECTION INTRAVENOUS at 19:01

## 2017-06-13 RX ADMIN — PIPERACILLIN AND TAZOBACTAM 25 GRAM(S): 4; .5 INJECTION, POWDER, LYOPHILIZED, FOR SOLUTION INTRAVENOUS at 13:37

## 2017-06-13 RX ADMIN — Medication 20.2 MICROGRAM(S)/KG/MIN: at 16:15

## 2017-06-13 RX ADMIN — PANTOPRAZOLE SODIUM 40 MILLIGRAM(S): 20 TABLET, DELAYED RELEASE ORAL at 12:38

## 2017-06-13 RX ADMIN — Medication 20.2 MICROGRAM(S)/KG/MIN: at 19:01

## 2017-06-13 RX ADMIN — Medication 20.2 MICROGRAM(S)/KG/MIN: at 03:45

## 2017-06-13 RX ADMIN — Medication 40 MILLIGRAM(S): at 12:39

## 2017-06-13 RX ADMIN — SODIUM CHLORIDE 100 MILLILITER(S): 9 INJECTION, SOLUTION INTRAVENOUS at 12:43

## 2017-06-13 RX ADMIN — Medication 20.2 MICROGRAM(S)/KG/MIN: at 13:37

## 2017-06-13 RX ADMIN — AMIODARONE HYDROCHLORIDE 200 MILLIGRAM(S): 400 TABLET ORAL at 05:42

## 2017-06-13 RX ADMIN — SODIUM CHLORIDE 100 MILLILITER(S): 9 INJECTION, SOLUTION INTRAVENOUS at 04:01

## 2017-06-13 RX ADMIN — SODIUM CHLORIDE 2000 MILLILITER(S): 9 INJECTION INTRAMUSCULAR; INTRAVENOUS; SUBCUTANEOUS at 03:29

## 2017-06-13 RX ADMIN — FENTANYL CITRATE 100 MICROGRAM(S): 50 INJECTION INTRAVENOUS at 04:55

## 2017-06-13 RX ADMIN — Medication 650 MILLIGRAM(S): at 13:37

## 2017-06-13 RX ADMIN — Medication 40 MILLIGRAM(S): at 05:24

## 2017-06-13 RX ADMIN — FENTANYL CITRATE 50 MICROGRAM(S): 50 INJECTION INTRAVENOUS at 20:20

## 2017-06-13 RX ADMIN — ATORVASTATIN CALCIUM 40 MILLIGRAM(S): 80 TABLET, FILM COATED ORAL at 21:52

## 2017-06-13 RX ADMIN — Medication 40 MILLIGRAM(S): at 17:14

## 2017-06-13 RX ADMIN — Medication 40 MILLIGRAM(S): at 02:35

## 2017-06-13 RX ADMIN — Medication 325 MILLIGRAM(S): at 21:52

## 2017-06-13 NOTE — PROGRESS NOTE ADULT - PROBLEM SELECTOR PLAN 2
Panculture  Antibiotics  ID consult  Oxygen supp  DVT PPX Panculture  Antibiotics  ID consult  Oxygen supp  follow up CXR

## 2017-06-13 NOTE — CONSULT NOTE ADULT - PROBLEM SELECTOR PROBLEM 3
Debility
Pleural effusion
Afib
Urinary tract infection with hematuria, site unspecified
DVT (deep venous thrombosis)

## 2017-06-13 NOTE — CONSULT NOTE ADULT - PROBLEM SELECTOR PROBLEM 1
Acute on chronic respiratory failure
Pneumonia
Aspiration pneumonia, unspecified aspiration pneumonia type, unspecified laterality, unspecified part of lung
Microscopic hematuria
Pneumonia
Proteinuria, unspecified type
Acute respiratory failure with hypoxia and hypercapnia

## 2017-06-13 NOTE — PROGRESS NOTE ADULT - PROBLEM SELECTOR PLAN 1
Bronchodilators  Ventilator support  DVT and Stress Ulcer PPX  NGT nutrition  Avoid Auto PEEP  Pulmonary toilet

## 2017-06-13 NOTE — PROGRESS NOTE ADULT - SUBJECTIVE AND OBJECTIVE BOX
Meds:  piperacillin/tazobactam IVPB. 3.375Gram(s) IV Intermittent every 8 hours  vancomycin  IVPB 750milliGRAM(s) IV Intermittent every 12 hours    Allergies:  Allergies    No Known Allergies    Intolerances        ROS  [ x ] UNABLE TO ELICIT    General:  [  ] None  [  ] Fever  [  ] Chills  [  ] Malaise    Skin:  [  ] None [  ] Rash  [  ] Wound  [  ] Ulcer    HEENT:  [  ] None  [  ] Sore Throat  [  ] Nasal congestion/ runny nose  [  ] Photophobia [  ] Neck pain      Chest:  [  ] None   [  ] SOB  [  ] Cough  [  ] None    Cardiovascular:   [  ] None  [  ] CP  [  ] Palpitation    Gastrointestinal:  [  ] None  [  ] Abd pain   [  ] Nausea    [  ] Vomiting   [  ] Diarrhea	     Genitourinary:  [  ] None [  ] Polyuria   [  ] Urgency  [  ] Frequency  [  ] Dysuria    [  ]  Hematuria       Musculoskeletal:  [  ] None [  ] Back Pain	[  ] Body aches  [  ] Joint pain    Neurological: [  ] None [  ]Dizziness  [  ]Visual Disturbance  [  ]Headaches   [  ] Weakness          PHYSICAL EXAM:    Vital Signs Last 24 Hrs  T(C): 37.2, Max: 37.2 (06-13 @ 09:00)  T(F): 99, Max: 99 (06-13 @ 09:00)  HR: 111 (76 - 111)  BP: 81/53 (77/47 - 122/36)  BP(mean): 60 (44 - 83)  RR: 34 (11 - 35)  SpO2: 97% (86% - 100%)    HEENT: [  ] Wnl  [  ] Pharyngeal congestion [x] Intubated.    Neck:  [ x ] Supple  [  ]Lymphadenopathy  [ x ] No JVD   [  ] JVD  [  ] Masses   [  ] WNL    CHEST/Respiratory:  [  ]Clear to auscultation  [ x ] Rales   [  ] Rhonchi   [  ] Wheezing     [  ] Chest Tenderness      Cardiovascular:  [ x ] Reg S1 S2   [  ] Irreg S1 S2   [  ]No Murmur  [x  ] +ve Murmurs  [x  ]Systolic [  ]Diastolic      Abdomen:  [x  ] Soft  [x  ] No tendrerness  [  ] Tenderness  [  ] Organomegaly  [  ] ABD Distention  [  ] Rigidity                       [ x ] No Regidity                       [ x ] No Rebound Tenderness  [ x ] No Guarding Rigidity  [  ] Rebound Tenderness[  ] Guarding Rigidity                          [x  ]  +ve Bowel Sounds  [  ] Decreased Bowel Sounds    [  ] Absent Bowel Sounds                            Extremities: [  ] No edema [x  ] Edema upper extremities   [  ] Clubbing   [  ] Cyanosis                         [  ] No Tender Calf muscles  [  ] Tender Calf muscles                        [  ] Palpable peripheral pulses  Neurological: [  ] Awake  [  ] Alert  [  ] Oriented  x                             [  ] Confused  [  ] Drowzy  [ x ] repond to painful stimuli  [  ] Unresponsive    Skin:  [  ] Intact [  ] Redness [  ] Thrombophlebitis  [ x ]  Ecchymotic Rashes upper ext.   [  ] Dry  [  ] Ulcers    Ortho:  [  ] Joint Swelling  [  ] Joint erythema [  ] Joint tenderness                [  ] Increased temp. to touch  [ x ] DJD             LABS/DIAGNOSTIC TESTS                          11.3   14.9  )-----------( 251      ( 13 Jun 2017 02:52 )             35.9     Lactate, Blood: 1.0 mmol/L (06-13 @ 02:52)      06-13    137  |  102  |  17  ----------------------------<  104<H>  4.1   |  26  |  0.58    Ca    8.2<L>      13 Jun 2017 02:52  Phos  3.5     06-13  Mg     2.1     06-13    TPro  5.8<L>  /  Alb  1.7<L>  /  TBili  0.7  /  DBili  x   /  AST  171<H>  /  ALT  182<H>  /  AlkPhos  95  06-12      LIVER FUNCTIONS - ( 12 Jun 2017 06:32 )  Alb: 1.7 g/dL / Pro: 5.8 g/dL / ALK PHOS: 95 U/L / ALT: 182 U/L DA / AST: 171 U/L / GGT: x               CULTURES:       RADIOLOGY  CXR:  EXAM:  CHEST-PORTABLE STAT                            PROCEDURE DATE:  06/13/2017        INTERPRETATION:  CLINICAL STATEMENT: Follow-up chest pain.    TECHNIQUE: AP view of the chest.    COMPARISON: 6/13/2017 at 229    FINDINGS/  IMPRESSION:  Studylimited due to rotation. ET tube noted tip approximately 2.1 cm   from giuseppe. New feeding tube noted tip overlying the GE junction at the   level of the diaphragm. Repositioning recommended.    Increased interstitial lung markings without significant change. No   pneumothorax.    Small bilateral pleural effusions with adjacent airspace opacities   without significant change.    IVC filter present.    Heart size cannot be accurately assessed in this projection.    ICU notified 6/13/2017 at 8:33 AM        Assessment and Recommendation:   · Assessment		  Patint with history of COPD, P AFIB, HTN, HLD, and DVT presented with symptoms and signs for Pneumonia,  Work up suggested for Pneumonia, mucous plugging and chololithiasis.  Patient was started in IV Vancomycin and Zosyn.  Patient is intubated after went in respiratory distress and patient is transferred to ICU.    Problem/Recommendation - 1:  Problem: Pneumonia.   Recommendation:   1- Continue IV Vancomycin.  2- Continue IV Zosyn.   3- Respirator management as per pulmonary.  4- ICU monitoring and management.  5- F/u CXR.   6- COPD management  7- Follow vancomycin trough.    Problem/Recommendation - 2:  ·  Problem: COPD (chronic obstructive pulmonary disease).    Recommendation:   1-Bronchodilators.  2-O2 as needed.  3-Pulmonary evaluation and management.  4- Steroids as per primary, and pulmonary team if needed.     Problem/Recommendation - 3:  ·  Problem: Afib.    Recommendation:   1- monitor heart rate closely.  2- Blood pressure control.  3- Cardiac meds as per cardiology and primary care team.     Problem/Recommendation - 4:  ·  Problem: HTN (hypertension).    Recommendation:   1- monitor Blood pressure closely.  2-Blood pressure control.  3- BP. meds as per cardiology and primary care team.     Problem/Recommendation - 5:  ·  Problem: HLD (hyperlipidemia).    Recommendation:   1- low fat low cholesterol diet.  2- Lipid profile.  3- Cholesterol medication as per primary care team.     Problem/Recommendation - 6:  Problem: Anemia.   Recommendation:   1- Anemia profile.  2- iron supplements.  3- Closely monitor H & H.  4- Observe for bleeding.    Discussed with pulmonary and patient' son on the bedside.

## 2017-06-13 NOTE — PROGRESS NOTE ADULT - PROBLEM SELECTOR PLAN 1
Proteinuria with normal renal function  likely in the setting of infection (PNA/ UTI)/ microscopic hematuria:   Check urine pr/Cr to quantify proteinuria. Check SIFE.  Pt with neg HepBsAg and neg HepC.   Repeat UA when infection cleared.

## 2017-06-13 NOTE — PROGRESS NOTE ADULT - PROBLEM SELECTOR PLAN 3
Bronchodilators  Oxygen supp  Advair diskus 250/50 mcgs po bid  Pfts as outpatient. Bronchodilators  Oxygen supp  Pfts as outpatient

## 2017-06-13 NOTE — CONSULT NOTE ADULT - PROBLEM SELECTOR RECOMMENDATION 9
Secondary COPD exacerbation/aspiration pneumonia. Patient on 24 hr O2 per NC prior to hospitalization; now sedated and intubated. Patient remains a full code. Secondary COPD exacerbation/aspiration pneumonia. Patient dependent on O2 per NC prior to hospitalization; s/p RRT for respiratory distress - now sedated and intubated. Patient remains a full code. Secondary aspiration pneumonia/COPD exacerbation. Patient dependent on O2 per NC prior to hospitalization; s/p RRT for respiratory distress this morning and was emergently intubated. Patient remains a full code.

## 2017-06-13 NOTE — PROGRESS NOTE ADULT - SUBJECTIVE AND OBJECTIVE BOX
[facility] NEPHROLOGY- PROGRESS NOTE    85y Female with h/o DVT, Lupus anticoagulant on A/C, Parox. AFib, HTN, COPD on home O2, a/w PNA. Renal consulted for proteinuria.   O/N Pt with hypoxic resp failure s/p intubated in the MICU, sedated on pressors.     Hospital Medications: Medications reviewed.  REVIEW OF SYSTEMS: Unable to obtain    VITALS:  T(F): 95.8, Max: 97.7 ( @ 20:58)  HR: 107  BP: 97/38  RR: 31  SpO2: 97%  Wt(kg): --  Height (cm): 152.4 ( @ 03:24)  Weight (kg): 44.1 ( @ 03:24)  BMI (kg/m2): 19 ( @ 03:24)  BSA (m2): 1.37 ( @ 03:24)  I & Os for 24h ending  @ 07:00  =============================================  IN: 2588.3 ml / OUT: 600 ml / NET: 1988.3 ml    I & Os for current day (as of  @ 12:26)  =============================================  IN: 292.5 ml / OUT: 0 ml / NET: 292.5 ml    PHYSICAL EXAM:  Constitutional: Sedated  Respiratory: trace rales at right base, decreased BS at left base; intubated  Cardiovascular: S1, S2, tachy  Gastrointestinal: BS+, soft, NT/ND  Extremities: No peripheral edema    LABS:      137  |  102  |  17  ----------------------------<  104<H>  4.1   |  26  |  0.58    Ca    8.2<L>      2017 02:52  Phos  3.5       Mg     2.1         TPro  5.8<L>  /  Alb  1.7<L>  /  TBili  0.7  /  DBili      /  AST  171<H>  /  ALT  182<H>  /  AlkPhos  95  06-12    Creatinine Trend: 0.58 <--, 0.46 <--, 0.81 <--                        11.3   14.9  )-----------( 251      ( 2017 02:52 )             35.9     Urine Studies:  Urinalysis Basic - ( 2017 13:25 )    Color: Yellow / Appearance: Clear / S.010 / pH:   Gluc:  / Ketone: Negative  / Bili: Negative / Urobili: 4   Blood:  / Protein: 100 / Nitrite: Positive   Leuk Esterase: Moderate / RBC: 25-50 /HPF / WBC 3-5 /HPF   Sq Epi:  / Non Sq Epi: Few / Bacteria: Moderate /HPF        RADIOLOGY & ADDITIONAL STUDIES:

## 2017-06-13 NOTE — PROGRESS NOTE ADULT - PROBLEM SELECTOR PLAN 4
: ekg nsr   c/w amiodarone and coumadin improve score 4 patient is on coumadin ekg nsr   c/w amiodarone , rate controlled, held coumadin

## 2017-06-13 NOTE — CONSULT NOTE ADULT - PROBLEM SELECTOR RECOMMENDATION 5
1- low fat low cholesterol diet.  2- Lipid profile.  3- Cholesterol medication as per primary care team.
hold cardizem for now as pt in septic shock.

## 2017-06-13 NOTE — PROGRESS NOTE ADULT - ASSESSMENT
85y Female with h/o DVT, Lupus anticoagulant on A/C, Parox. AFib, HTN, COPD on home O2, a/w PNA. Hosp cb hypoxic resp failure s/p intubated now on pressors in the MICU. Renal consulted for proteinuria.

## 2017-06-13 NOTE — PROGRESS NOTE ADULT - PROBLEM SELECTOR PLAN 2
In the setting of UTI  repeat UA once infection cleared.   If persistent; recommend CT abd/pelvis for further evaluation.

## 2017-06-13 NOTE — PROGRESS NOTE ADULT - PROBLEM SELECTOR PLAN 2
elevated liver enzymes: elevated liver enzymes of bili 1.3 ,  . ALT of 227 .   uncertain etiology , patient is not complaining of abdominal pain  elevated pro bnp 1149   f/u us gall bladder f/u hepatitis profile hold statuin f/u echo to r/o congestion dvt : lupus anticoagulant positive , INR 2.3  c/w coumadin at home dose alternates 1 mg and 2 mg elevated liver enzymes: elevated liver enzymes lfts trending down likely sec to cholelithiasis.  USG Abd showed cholelithiasis and starry diane appearnace  elevated pro bnp 1149   f/u us gall bladder f/u hepatitis profile hold statuin f/u echo to r/o congestion

## 2017-06-13 NOTE — CONSULT NOTE ADULT - SUBJECTIVE AND OBJECTIVE BOX
HPI:  85 year old female presented to the ER with complaints of feeling malaise , SOB and productive cough for the past 2 days, admitted for aspiration PNA. s/p RRT for respiratory distress - emergency intubation and central line placement. Pressor increased.     PAST MEDICAL & SURGICAL HISTORY:  DVT (deep venous thrombosis)  Lupus anticoagulant disorder  No significant past surgical history      SOCIAL HISTORY:    Admitted from:  home, lives with daughter   Tobacco hx: FORMER SMOKER 1 PPD /20 YEARS quit 20 years ago                              Home Opioid hx: none  Mandaeism:                                    Preferred Language: English    Surrogate/HCP/Guardian:  Anayeli (dtr/HCP)          Phone#: [phone]    FAMILY HISTORY: noncontributory to current condition    Baseline ADLs (prior to admission):    No Known Allergies      Present Symptoms:   Review of Systems: Patient sedated and intubated - unable to obtain ROS    MEDICATIONS  (STANDING):  amiodarone    Tablet 200milliGRAM(s) Oral daily  latanoprost 0.005% Ophthalmic Solution 1Drop(s) Both EYES at bedtime  piperacillin/tazobactam IVPB. 3.375Gram(s) IV Intermittent every 8 hours  vancomycin  IVPB 750milliGRAM(s) IV Intermittent every 12 hours  norepinephrine Infusion 0.5MICROgram(s)/kG/Min IV Continuous <Continuous>  dexmedetomidine Infusion 0.4MICROgram(s)/kG/Hr IV Continuous <Continuous>  pantoprazole  Injectable 40milliGRAM(s) IV Push daily  lactated ringers. 1000milliLiter(s) IV Continuous <Continuous>  methylPREDNISolone sodium succinate Injectable 40milliGRAM(s) IV Push every 6 hours    MEDICATIONS  (PRN):  ondansetron Injectable 4milliGRAM(s) IV Push every 6 hours PRN Nausea and/or Vomiting  fentaNYL    Injectable 50MICROGram(s) IV Push every 6 hours PRN sedation  ALBUTerol    90 MICROgram(s) HFA Inhaler 2Puff(s) Inhalation every 6 hours PRN Bronchospasm      PHYSICAL EXAM:    Vital Signs Last 24 Hrs  T(C): 38.7, Max: 38.7 (06-13 @ 12:30)  T(F): 101.7, Max: 101.7 (06-13 @ 12:30)  HR: 111 (76 - 111)  BP: 81/53 (77/47 - 122/36)  BP(mean): 60 (44 - 83)  RR: 34 (11 - 35)  SpO2: 97% (86% - 100%)    General: patient sedated and intubated, nonverbal. Opens eyes briefly.   Karnofsky Performance Score/Palliative Performance Status Version2:     10%    HEENT: dry lips, ET tube  Lungs:  tachypnea/labored breathing   CV:  tachycardia  GI: incontinent  : ramirez  Musculoskeletal: bedbound, anasarca BUE   Skin: normal  pressure ulcers: stage: edema: other:  Neuro: patient sedated.   Oral intake ability: unable/only mouth care   Diet: NPO    LABS:                        11.3   14.9  )-----------( 251      ( 13 Jun 2017 02:52 )             35.9     06-13    137  |  102  |  17  ----------------------------<  104<H>  4.1   |  26  |  0.58    Ca    8.2<L>      13 Jun 2017 02:52  Phos  3.5     06-13  Mg     2.1     06-13    TPro  5.8<L>  /  Alb  1.7<L>  /  TBili  0.7  /  DBili  x   /  AST  171<H>  /  ALT  182<H>  /  AlkPhos  95  06-12        RADIOLOGY & ADDITIONAL STUDIES:    ADVANCE DIRECTIVES: Full code. DaughterAnayeli, is HCP  Advanced Care Planning discussion total time spent: HPI:  85 year old female presented to the ER with complaints of feeling malaise, SOB and productive cough for the past 2 days, admitted for aspiration PNA. s/p RRT for respiratory distress - emergency intubation and central line placement. Pressor increased this morning. Patient remains a full code.     PAST MEDICAL & SURGICAL HISTORY:  DVT (deep venous thrombosis)  Lupus anticoagulant disorder  No significant past surgical history      SOCIAL HISTORY:    Admitted from:  home, lives with daughter   Tobacco hx: FORMER SMOKER 1 PPD /20 YEARS quit 20 years ago             Home Opioid hx: none  Preferred Language: English    Surrogate/HCP/Guardian:  Anayeli (dtr/HCP)          Phone#: [phone]    FAMILY HISTORY: noncontributory to current condition    Baseline ADLs (prior to admission): ambulatory with walker, continent, able to navigate 10 stairs to bathroom    No Known Allergies      Present Symptoms:   Review of Systems: Patient sedated and intubated - unable to obtain ROS    MEDICATIONS  (STANDING):  amiodarone    Tablet 200milliGRAM(s) Oral daily  latanoprost 0.005% Ophthalmic Solution 1Drop(s) Both EYES at bedtime  piperacillin/tazobactam IVPB. 3.375Gram(s) IV Intermittent every 8 hours  vancomycin  IVPB 750milliGRAM(s) IV Intermittent every 12 hours  norepinephrine Infusion 0.5MICROgram(s)/kG/Min IV Continuous <Continuous>  dexmedetomidine Infusion 0.4MICROgram(s)/kG/Hr IV Continuous <Continuous>  pantoprazole  Injectable 40milliGRAM(s) IV Push daily  lactated ringers. 1000milliLiter(s) IV Continuous <Continuous>  methylPREDNISolone sodium succinate Injectable 40milliGRAM(s) IV Push every 6 hours    MEDICATIONS  (PRN):  ondansetron Injectable 4milliGRAM(s) IV Push every 6 hours PRN Nausea and/or Vomiting  fentaNYL    Injectable 50MICROGram(s) IV Push every 6 hours PRN sedation  ALBUTerol    90 MICROgram(s) HFA Inhaler 2Puff(s) Inhalation every 6 hours PRN Bronchospasm      PHYSICAL EXAM:    Vital Signs Last 24 Hrs  T(C): 38.7, Max: 38.7 (06-13 @ 12:30)  T(F): 101.7, Max: 101.7 (06-13 @ 12:30)  HR: 111 (76 - 111)  BP: 81/53 (77/47 - 122/36)  BP(mean): 60 (44 - 83)  RR: 34 (11 - 35)  SpO2: 97% (86% - 100%)    General: patient sedated and intubated, nonverbal. Opens eyes briefly.   Karnofsky Performance Score/Palliative Performance Status Version2:     10%    HEENT: dry lips, ET tube  Lungs:  tachypnea/labored breathing   CV:  tachycardia  GI: incontinent  : ramirez  Musculoskeletal: bedbound  Skin: anasarca BUE, b/l UE and LE ecchymosis, 1+ pedal edema b/l  Neuro: patient sedated  Oral intake ability: unable/only mouth care   Diet: NPO    LABS:                        11.3   14.9  )-----------( 251      ( 13 Jun 2017 02:52 )             35.9     06-13    137  |  102  |  17  ----------------------------<  104<H>  4.1   |  26  |  0.58    Ca    8.2<L>      13 Jun 2017 02:52  Phos  3.5     06-13  Mg     2.1     06-13    TPro  5.8<L>  /  Alb  1.7<L>  /  TBili  0.7  /  DBili  x   /  AST  171<H>  /  ALT  182<H>  /  AlkPhos  95  06-12        RADIOLOGY & ADDITIONAL STUDIES: reviewed      ADVANCE DIRECTIVES: Full code. Daughter, Anayeli, is HCP  Advanced Care Planning discussion total time spent:  30 min HPI:  85 year old female presented to the ER with complaints of feeling malaise, SOB and productive cough for the past 2 days, admitted for aspiration PNA. s/p RRT for respiratory distress - emergently intubated this morning and central line placed. Pressor increased this morning. Patient remains a full code.     PAST MEDICAL & SURGICAL HISTORY:  DVT (deep venous thrombosis)  Lupus anticoagulant disorder  No significant past surgical history      SOCIAL HISTORY:    Admitted from:  home, lives with daughter   Tobacco hx: FORMER SMOKER 1 PPD /20 YEARS quit 20 years ago             Home Opioid hx: none  Preferred Language: English    Surrogate/HCP/Guardian:  Anayeli (dtr/HCP)          Phone#: [phone]    FAMILY HISTORY: noncontributory to current condition    Baseline ADLs (prior to admission): ambulatory with walker, continent, able to navigate 10 stairs to bathroom    No Known Allergies      Present Symptoms:   Review of Systems: Patient sedated and intubated - unable to obtain ROS    MEDICATIONS  (STANDING):  amiodarone    Tablet 200milliGRAM(s) Oral daily  latanoprost 0.005% Ophthalmic Solution 1Drop(s) Both EYES at bedtime  piperacillin/tazobactam IVPB. 3.375Gram(s) IV Intermittent every 8 hours  vancomycin  IVPB 750milliGRAM(s) IV Intermittent every 12 hours  norepinephrine Infusion 0.5MICROgram(s)/kG/Min IV Continuous <Continuous>  dexmedetomidine Infusion 0.4MICROgram(s)/kG/Hr IV Continuous <Continuous>  pantoprazole  Injectable 40milliGRAM(s) IV Push daily  lactated ringers. 1000milliLiter(s) IV Continuous <Continuous>  methylPREDNISolone sodium succinate Injectable 40milliGRAM(s) IV Push every 6 hours    MEDICATIONS  (PRN):  ondansetron Injectable 4milliGRAM(s) IV Push every 6 hours PRN Nausea and/or Vomiting  fentaNYL    Injectable 50MICROGram(s) IV Push every 6 hours PRN sedation  ALBUTerol    90 MICROgram(s) HFA Inhaler 2Puff(s) Inhalation every 6 hours PRN Bronchospasm      PHYSICAL EXAM:    Vital Signs Last 24 Hrs  T(C): 38.7, Max: 38.7 (06-13 @ 12:30)  T(F): 101.7, Max: 101.7 (06-13 @ 12:30)  HR: 111 (76 - 111)  BP: 81/53 (77/47 - 122/36)  BP(mean): 60 (44 - 83)  RR: 34 (11 - 35)  SpO2: 97% (86% - 100%)    General: patient sedated and intubated, nonverbal. Opens eyes briefly.   Karnofsky Performance Score/Palliative Performance Status Version2:     10%    HEENT: dry lips, ET tube  Lungs:  tachypnea/labored breathing   CV:  tachycardia  GI: incontinent  : ramirez  Musculoskeletal: bedbound  Skin: anasarca BUE, b/l UE and LE ecchymosis, 1+ pedal edema b/l  Neuro: patient sedated  Oral intake ability: unable/only mouth care   Diet: NPO    LABS:                        11.3   14.9  )-----------( 251      ( 13 Jun 2017 02:52 )             35.9     06-13    137  |  102  |  17  ----------------------------<  104<H>  4.1   |  26  |  0.58    Ca    8.2<L>      13 Jun 2017 02:52  Phos  3.5     06-13  Mg     2.1     06-13    TPro  5.8<L>  /  Alb  1.7<L>  /  TBili  0.7  /  DBili  x   /  AST  171<H>  /  ALT  182<H>  /  AlkPhos  95  06-12        RADIOLOGY & ADDITIONAL STUDIES: reviewed      ADVANCE DIRECTIVES: Full code. DaughterAnayeli, is HCP  Advanced Care Planning discussion total time spent:  30 min HPI:  85 year old female presented to the ER with complaints of feeling malaise, SOB and productive cough for the past 2 days, admitted for aspiration PNA. s/p RRT for respiratory distress - emergently intubated this morning and central line placed. Patient now sedated and intubated; on pressor. Full code on file.    PAST MEDICAL & SURGICAL HISTORY:  DVT (deep venous thrombosis)  Lupus anticoagulant disorder  No significant past surgical history      SOCIAL HISTORY:    Admitted from:  home, lives with daughter   Tobacco hx: FORMER SMOKER 1 PPD /20 YEARS quit 20 years ago             Home Opioid hx: none  Preferred Language: English    Surrogate/HCP/Guardian:  Anayeli (dtr/HCP)          Phone#: [phone]    FAMILY HISTORY: noncontributory to current condition    Baseline ADLs (prior to admission): ambulatory with walker, continent, able to navigate 10 stairs to bathroom    No Known Allergies      Present Symptoms:   Review of Systems: Patient sedated and intubated - unable to obtain ROS    MEDICATIONS  (STANDING):  amiodarone    Tablet 200milliGRAM(s) Oral daily  latanoprost 0.005% Ophthalmic Solution 1Drop(s) Both EYES at bedtime  piperacillin/tazobactam IVPB. 3.375Gram(s) IV Intermittent every 8 hours  vancomycin  IVPB 750milliGRAM(s) IV Intermittent every 12 hours  norepinephrine Infusion 0.5MICROgram(s)/kG/Min IV Continuous <Continuous>  dexmedetomidine Infusion 0.4MICROgram(s)/kG/Hr IV Continuous <Continuous>  pantoprazole  Injectable 40milliGRAM(s) IV Push daily  lactated ringers. 1000milliLiter(s) IV Continuous <Continuous>  methylPREDNISolone sodium succinate Injectable 40milliGRAM(s) IV Push every 6 hours    MEDICATIONS  (PRN):  ondansetron Injectable 4milliGRAM(s) IV Push every 6 hours PRN Nausea and/or Vomiting  fentaNYL    Injectable 50MICROGram(s) IV Push every 6 hours PRN sedation  ALBUTerol    90 MICROgram(s) HFA Inhaler 2Puff(s) Inhalation every 6 hours PRN Bronchospasm      PHYSICAL EXAM:    Vital Signs Last 24 Hrs  T(C): 38.7, Max: 38.7 (06-13 @ 12:30)  T(F): 101.7, Max: 101.7 (06-13 @ 12:30)  HR: 111 (76 - 111)  BP: 81/53 (77/47 - 122/36)  BP(mean): 60 (44 - 83)  RR: 34 (11 - 35)  SpO2: 97% (86% - 100%)    General: patient sedated and intubated, nonverbal. Opens eyes briefly.   Karnofsky Performance Score/Palliative Performance Status Version2:     10%    HEENT: dry lips, ET tube  Lungs:  tachypnea/labored breathing   CV:  tachycardia  GI: incontinent  : ramirez  Musculoskeletal: bedbound  Skin: anasarca BUE, b/l UE and LE ecchymosis, 1+ pedal edema b/l  Neuro: patient sedated  Oral intake ability: unable/only mouth care   Diet: NPO    LABS:                        11.3   14.9  )-----------( 251      ( 13 Jun 2017 02:52 )             35.9     06-13    137  |  102  |  17  ----------------------------<  104<H>  4.1   |  26  |  0.58    Ca    8.2<L>      13 Jun 2017 02:52  Phos  3.5     06-13  Mg     2.1     06-13    TPro  5.8<L>  /  Alb  1.7<L>  /  TBili  0.7  /  DBili  x   /  AST  171<H>  /  ALT  182<H>  /  AlkPhos  95  06-12        RADIOLOGY & ADDITIONAL STUDIES: reviewed      ADVANCE DIRECTIVES: Full code. DaughterAnayeli, is HCP  Advanced Care Planning discussion total time spent:  30 min

## 2017-06-13 NOTE — PROGRESS NOTE ADULT - SUBJECTIVE AND OBJECTIVE BOX
INTERVAL HPI/OVERNIGHT EVENTS:    PRESSORS: [x ] YES [ ] NO  WHICH: levophed    ANTIBIOTICS:  Ramo Ji       DATE STARTED: 17  ANTIBIOTICS:                  DATE STARTED:  ANTIBIOTICS:                  DATE STARTED:    Antimicrobial:  piperacillin/tazobactam IVPB. 3.375Gram(s) IV Intermittent every 8 hours  vancomycin  IVPB 750milliGRAM(s) IV Intermittent every 12 hours    Cardiovascular:  amiodarone    Tablet 200milliGRAM(s) Oral daily  norepinephrine Infusion 0.5MICROgram(s)/kG/Min IV Continuous <Continuous>    Pulmonary:  ALBUTerol    90 MICROgram(s) HFA Inhaler 2Puff(s) Inhalation every 6 hours PRN    Hematalogic:    Other:  latanoprost 0.005% Ophthalmic Solution 1Drop(s) Both EYES at bedtime  sodium chloride 0.9%. 1000milliLiter(s) IV Continuous <Continuous>  ondansetron Injectable 4milliGRAM(s) IV Push every 6 hours PRN  dexmedetomidine Infusion 0.4MICROgram(s)/kG/Hr IV Continuous <Continuous>  fentaNYL    Injectable 50MICROGram(s) IV Push every 6 hours PRN  pantoprazole  Injectable 40milliGRAM(s) IV Push daily  lactated ringers. 1000milliLiter(s) IV Continuous <Continuous>  methylPREDNISolone sodium succinate Injectable 40milliGRAM(s) IV Push every 6 hours    amiodarone    Tablet 200milliGRAM(s) Oral daily  latanoprost 0.005% Ophthalmic Solution 1Drop(s) Both EYES at bedtime  sodium chloride 0.9%. 1000milliLiter(s) IV Continuous <Continuous>  piperacillin/tazobactam IVPB. 3.375Gram(s) IV Intermittent every 8 hours  vancomycin  IVPB 750milliGRAM(s) IV Intermittent every 12 hours  ondansetron Injectable 4milliGRAM(s) IV Push every 6 hours PRN  norepinephrine Infusion 0.5MICROgram(s)/kG/Min IV Continuous <Continuous>  dexmedetomidine Infusion 0.4MICROgram(s)/kG/Hr IV Continuous <Continuous>  fentaNYL    Injectable 50MICROGram(s) IV Push every 6 hours PRN  pantoprazole  Injectable 40milliGRAM(s) IV Push daily  lactated ringers. 1000milliLiter(s) IV Continuous <Continuous>  methylPREDNISolone sodium succinate Injectable 40milliGRAM(s) IV Push every 6 hours  ALBUTerol    90 MICROgram(s) HFA Inhaler 2Puff(s) Inhalation every 6 hours PRN    Drug Dosing Weight  Height (cm): 152.4 (2017 03:24)  Weight (kg): 44.1 (2017 03:24)  BMI (kg/m2): 19 (2017 03:24)  BSA (m2): 1.37 (2017 03:24)    CENTRAL LINE: [x ] YES [ ] NO  LOCATION: Rt Fem Line   DATE INSERTED:   REMOVE: [ ] YES [ ] NO  EXPLAIN:    ZEE: [x ] YES [ ] NO    DATE INSERTED:   REMOVE:  [ ] YES [ x] NO  EXPLAIN: I/O    A-LINE:  [ ] YES [ x] NO  LOCATION:   DATE INSERTED:  REMOVE:  [ ] YES [ ] NO  EXPLAIN:    PMH -reviewed admission note, no change since admission  Heart faliure: acute [ ] chronic [ ] acute or chronic [ ] diastolic [ ] systolic [ ] combied systolic and diastolic[ ]  REGGIE: ATN[ ] renal medullary necrosis [ ] CKD I [ ]CKDII [ ]CKD III [ ]CKD IV [ ]CKD V [ ]Other pathological lesions [ ]  Abdominal Nutrition Status: malnutrition [ ] cachexia [ ] morbid obesity/BMI=40 [ ] Supplement ordered [__]     ICU Vital Signs Last 24 Hrs  T(C): 35.4, Max: 36.5 ( @ 20:58)  T(F): 95.8, Max: 97.7 (12 @ 20:58)  HR: 88 (76 - 97)  BP: 119/44 (79/32 - 122/36)  BP(mean): 61 (44 - 64)  ABP: --  ABP(mean): --  RR: 20 (11 - 35)  SpO2: 99% (86% - 100%)      ABG - ( 2017 05:19 )  pH: 7.19  /  pCO2: 65    /  pO2: 445   / HCO3: 24    / Base Excess: -4.5  /  SaO2: >98           Mode: AC/ CMV (Assist Control/ Continuous Mandatory Ventilation)  RR (machine): 20  TV (machine): 350  FiO2: 35  PEEP: 5  ITime: 1  MAP: 9  PIP: 32      PHYSICAL EXAM:    GENERAL: [X ]NAD, [ ]well-groomed, [ ]well-developed  HEAD:  [X ]Atraumatic, [ ]Normocephalic  EYES: [ ]EOMI, [X ]PERRLA, [ ]conjunctiva and sclera clear  ENMT: [ ]No tonsillar erythema, exudates, or enlargement; [X ]Moist mucous membranes, [ ]Good dentition, [ ]No lesions  NECK: [X ]Supple, normal appearance, [ ]No JVD; [ ]Normal thyroid; [ ]Trachea midline  NERVOUS SYSTEM:  [ ]Alert & Oriented X3, [ ]Good concentration; [ ]Motor Strength 5/5 B/L upper and lower extremities; [ ]DTRs 2+ intact and symmetric- SEDATED  CHEST/LUNG: [ ]No chest deformity; [ ]Normal percussion bilaterally; [X ]No rales, rhonchi, wheezing   HEART: Irregular rate and rhythm; [X ]No murmurs, rubs, or gallops  ABDOMEN: [x ]Soft, Nontender, distended; [ x]Bowel sounds present  EXTREMITIES:  [ ]2+ Peripheral Pulses, [ ]No clubbing, cyanosis, or edema- b/l UE and LE ecchymosis and 1 + pedal edema b/l  LYMPH: [ ]No lymphadenopathy noted  SKIN: [ ]No rashes or lesions; [ ]Good capillary refill      LABS:  CBC Full  -  ( 2017 02:52 )  WBC Count : 14.9 K/uL  Hemoglobin : 11.3 g/dL  Hematocrit : 35.9 %  Platelet Count - Automated : 251 K/uL  Mean Cell Volume : 102.4 fl  Mean Cell Hemoglobin : 32.3 pg  Mean Cell Hemoglobin Concentration : 31.6 gm/dL  Auto Neutrophil # : 12.6 K/uL  Auto Lymphocyte # : 0.8 K/uL  Auto Monocyte # : 1.5 K/uL  Auto Eosinophil # : 0.0 K/uL  Auto Basophil # : 0.1 K/uL  Auto Neutrophil % : 84.2 %  Auto Lymphocyte % : 5.2 %  Auto Monocyte % : 9.9 %  Auto Eosinophil % : 0.0 %  Auto Basophil % : 0.5 %        137  |  102  |  17  ----------------------------<  104<H>  4.1   |  26  |  0.58    Ca    8.2<L>      2017 02:52  Phos  3.5       Mg     2.1         TPro  5.8<L>  /  Alb  1.7<L>  /  TBili  0.7  /  DBili  x   /  AST  171<H>  /  ALT  182<H>  /  AlkPhos  95      PT/INR - ( 2017 02:52 )   PT: 33.6 sec;   INR: 3.01 ratio         PTT - ( 2017 02:52 )  PTT:48.5 sec  Urinalysis Basic - ( 2017 13:25 )    Color: Yellow / Appearance: Clear / S.010 / pH: x  Gluc: x / Ketone: Negative  / Bili: Negative / Urobili: 4   Blood: x / Protein: 100 / Nitrite: Positive   Leuk Esterase: Moderate / RBC: 25-50 /HPF / WBC 3-5 /HPF   Sq Epi: x / Non Sq Epi: Few / Bacteria: Moderate /HPF      Culture Results:   No growth to date. ( @ 01:50)  Culture Results:   No growth to date. ( @ 01:50)      RADIOLOGY & ADDITIONAL STUDIES REVIEWED:  ***    [ ]GOALS OF CARE DISCUSSION WITH PATIENT/FAMILY/PROXY:    CRITICAL CARE TIME SPENT: 35 minutes INTERVAL HPI/OVERNIGHT EVENTS:    PRESSORS: [x ] YES [ ] NO  WHICH: levophed    ANTIBIOTICS:  Ramo Ji       DATE STARTED: 17  ANTIBIOTICS:                  DATE STARTED:  ANTIBIOTICS:                  DATE STARTED:    Antimicrobial:  piperacillin/tazobactam IVPB. 3.375Gram(s) IV Intermittent every 8 hours  vancomycin  IVPB 750milliGRAM(s) IV Intermittent every 12 hours    Cardiovascular:  amiodarone    Tablet 200milliGRAM(s) Oral daily  norepinephrine Infusion 0.5MICROgram(s)/kG/Min IV Continuous <Continuous>    Pulmonary:  ALBUTerol    90 MICROgram(s) HFA Inhaler 2Puff(s) Inhalation every 6 hours PRN    Hematalogic:    Other:  latanoprost 0.005% Ophthalmic Solution 1Drop(s) Both EYES at bedtime  sodium chloride 0.9%. 1000milliLiter(s) IV Continuous <Continuous>  ondansetron Injectable 4milliGRAM(s) IV Push every 6 hours PRN  dexmedetomidine Infusion 0.4MICROgram(s)/kG/Hr IV Continuous <Continuous>  fentaNYL    Injectable 50MICROGram(s) IV Push every 6 hours PRN  pantoprazole  Injectable 40milliGRAM(s) IV Push daily  lactated ringers. 1000milliLiter(s) IV Continuous <Continuous>  methylPREDNISolone sodium succinate Injectable 40milliGRAM(s) IV Push every 6 hours    amiodarone    Tablet 200milliGRAM(s) Oral daily  latanoprost 0.005% Ophthalmic Solution 1Drop(s) Both EYES at bedtime  sodium chloride 0.9%. 1000milliLiter(s) IV Continuous <Continuous>  piperacillin/tazobactam IVPB. 3.375Gram(s) IV Intermittent every 8 hours  vancomycin  IVPB 750milliGRAM(s) IV Intermittent every 12 hours  ondansetron Injectable 4milliGRAM(s) IV Push every 6 hours PRN  norepinephrine Infusion 0.5MICROgram(s)/kG/Min IV Continuous <Continuous>  dexmedetomidine Infusion 0.4MICROgram(s)/kG/Hr IV Continuous <Continuous>  fentaNYL    Injectable 50MICROGram(s) IV Push every 6 hours PRN  pantoprazole  Injectable 40milliGRAM(s) IV Push daily  lactated ringers. 1000milliLiter(s) IV Continuous <Continuous>  methylPREDNISolone sodium succinate Injectable 40milliGRAM(s) IV Push every 6 hours  ALBUTerol    90 MICROgram(s) HFA Inhaler 2Puff(s) Inhalation every 6 hours PRN    Drug Dosing Weight  Height (cm): 152.4 (2017 03:24)  Weight (kg): 44.1 (2017 03:24)  BMI (kg/m2): 19 (2017 03:24)  BSA (m2): 1.37 (2017 03:24)    CENTRAL LINE: [x ] YES [ ] NO  LOCATION: Rt Fem Line   DATE INSERTED:   REMOVE: [ ] YES [ ] NO  EXPLAIN:    ZEE: [x ] YES [ ] NO    DATE INSERTED:   REMOVE:  [ ] YES [ x] NO  EXPLAIN: I/O    A-LINE:  [ ] YES [ x] NO  LOCATION:   DATE INSERTED:  REMOVE:  [ ] YES [ ] NO  EXPLAIN:    PMH -reviewed admission note, no change since admission  Heart faliure: acute [ ] chronic [ ] acute or chronic [ ] diastolic [ ] systolic [ ] combied systolic and diastolic[ ]  REGGIE: ATN[ ] renal medullary necrosis [ ] CKD I [ ]CKDII [ ]CKD III [ ]CKD IV [ ]CKD V [ ]Other pathological lesions [ ]  Abdominal Nutrition Status: malnutrition [ ] cachexia [ ] morbid obesity/BMI=40 [ ] Supplement ordered [__]     ICU Vital Signs Last 24 Hrs  T(C): 35.4, Max: 36.5 ( @ 20:58)  T(F): 95.8, Max: 97.7 (12 @ 20:58)  HR: 88 (76 - 97)  BP: 119/44 (79/32 - 122/36)  BP(mean): 61 (44 - 64)  ABP: --  ABP(mean): --  RR: 20 (11 - 35)  SpO2: 99% (86% - 100%)      ABG - ( 2017 05:19 )  pH: 7.19  /  pCO2: 65    /  pO2: 445   / HCO3: 24    / Base Excess: -4.5  /  SaO2: >98           Mode: AC/ CMV (Assist Control/ Continuous Mandatory Ventilation)  RR (machine): 20  TV (machine): 350  FiO2: 35  PEEP: 5  ITime: 1  MAP: 9  PIP: 32      PHYSICAL EXAM:    GENERAL: [X ]NAD, [ ]well-groomed, [ ]well-developed  HEAD:  [X ]Atraumatic, [ ]Normocephalic  EYES: [ ]EOMI, [X ]PERRLA, [ ]conjunctiva and sclera clear  ENMT: [ ]No tonsillar erythema, exudates, or enlargement; [X ]Moist mucous membranes, [ ]Good dentition, [ ]No lesions  NECK: [X ]Supple, normal appearance, [ ]No JVD; [ ]Normal thyroid; [ ]Trachea midline  NERVOUS SYSTEM:  [ ]Alert & Oriented X3, [ ]Good concentration; [ ]Motor Strength 5/5 B/L upper and lower extremities; [ ]DTRs 2+ intact and symmetric- SEDATED  CHEST/LUNG: [ ]No chest deformity; [ ]Normal percussion bilaterally; [X ]No rales, rhonchi, wheezing   HEART: Irregular rate and rhythm; [X ]No murmurs, rubs, or gallops  ABDOMEN: [x ]Soft, Nontender, distended; [ x]Bowel sounds present  EXTREMITIES:  [ ]2+ Peripheral Pulses,- b/l UE and LE ecchymosis and 1 + pedal edema b/l  LYMPH: [ ]No lymphadenopathy noted  SKIN: [ ]No rashes or lesions; [ ]Good capillary refill- b/l UE and LE ecchymosis      LABS:  CBC Full  -  ( 2017 02:52 )  WBC Count : 14.9 K/uL  Hemoglobin : 11.3 g/dL  Hematocrit : 35.9 %  Platelet Count - Automated : 251 K/uL  Mean Cell Volume : 102.4 fl  Mean Cell Hemoglobin : 32.3 pg  Mean Cell Hemoglobin Concentration : 31.6 gm/dL  Auto Neutrophil # : 12.6 K/uL  Auto Lymphocyte # : 0.8 K/uL  Auto Monocyte # : 1.5 K/uL  Auto Eosinophil # : 0.0 K/uL  Auto Basophil # : 0.1 K/uL  Auto Neutrophil % : 84.2 %  Auto Lymphocyte % : 5.2 %  Auto Monocyte % : 9.9 %  Auto Eosinophil % : 0.0 %  Auto Basophil % : 0.5 %        137  |  102  |  17  ----------------------------<  104<H>  4.1   |  26  |  0.58    Ca    8.2<L>      2017 02:52  Phos  3.5       Mg     2.1         TPro  5.8<L>  /  Alb  1.7<L>  /  TBili  0.7  /  DBili  x   /  AST  171<H>  /  ALT  182<H>  /  AlkPhos  95      PT/INR - ( 2017 02:52 )   PT: 33.6 sec;   INR: 3.01 ratio         PTT - ( 2017 02:52 )  PTT:48.5 sec  Urinalysis Basic - ( 2017 13:25 )    Color: Yellow / Appearance: Clear / S.010 / pH: x  Gluc: x / Ketone: Negative  / Bili: Negative / Urobili: 4   Blood: x / Protein: 100 / Nitrite: Positive   Leuk Esterase: Moderate / RBC: 25-50 /HPF / WBC 3-5 /HPF   Sq Epi: x / Non Sq Epi: Few / Bacteria: Moderate /HPF      Culture Results:   No growth to date. ( @ 01:50)  Culture Results:   No growth to date. ( @ 01:50)      RADIOLOGY & ADDITIONAL STUDIES REVIEWED:  ***    [ ]GOALS OF CARE DISCUSSION WITH PATIENT/FAMILY/PROXY:    CRITICAL CARE TIME SPENT: 35 minutes INTERVAL HPI/OVERNIGHT EVENTS:    PRESSORS: [x ] YES [ ] NO  WHICH: levophed    ANTIBIOTICS:  Ramo Ji       DATE STARTED: 17  ANTIBIOTICS:                  DATE STARTED:  ANTIBIOTICS:                  DATE STARTED:    Antimicrobial:  piperacillin/tazobactam IVPB. 3.375Gram(s) IV Intermittent every 8 hours  vancomycin  IVPB 750milliGRAM(s) IV Intermittent every 12 hours    Cardiovascular:  amiodarone    Tablet 200milliGRAM(s) Oral daily  norepinephrine Infusion 0.5MICROgram(s)/kG/Min IV Continuous <Continuous>    Pulmonary:  ALBUTerol    90 MICROgram(s) HFA Inhaler 2Puff(s) Inhalation every 6 hours PRN    Hematalogic:    Other:  latanoprost 0.005% Ophthalmic Solution 1Drop(s) Both EYES at bedtime  sodium chloride 0.9%. 1000milliLiter(s) IV Continuous <Continuous>  ondansetron Injectable 4milliGRAM(s) IV Push every 6 hours PRN  dexmedetomidine Infusion 0.4MICROgram(s)/kG/Hr IV Continuous <Continuous>  fentaNYL    Injectable 50MICROGram(s) IV Push every 6 hours PRN  pantoprazole  Injectable 40milliGRAM(s) IV Push daily  lactated ringers. 1000milliLiter(s) IV Continuous <Continuous>  methylPREDNISolone sodium succinate Injectable 40milliGRAM(s) IV Push every 6 hours    amiodarone    Tablet 200milliGRAM(s) Oral daily  latanoprost 0.005% Ophthalmic Solution 1Drop(s) Both EYES at bedtime  sodium chloride 0.9%. 1000milliLiter(s) IV Continuous <Continuous>  piperacillin/tazobactam IVPB. 3.375Gram(s) IV Intermittent every 8 hours  vancomycin  IVPB 750milliGRAM(s) IV Intermittent every 12 hours  ondansetron Injectable 4milliGRAM(s) IV Push every 6 hours PRN  norepinephrine Infusion 0.5MICROgram(s)/kG/Min IV Continuous <Continuous>  dexmedetomidine Infusion 0.4MICROgram(s)/kG/Hr IV Continuous <Continuous>  fentaNYL    Injectable 50MICROGram(s) IV Push every 6 hours PRN  pantoprazole  Injectable 40milliGRAM(s) IV Push daily  lactated ringers. 1000milliLiter(s) IV Continuous <Continuous>  methylPREDNISolone sodium succinate Injectable 40milliGRAM(s) IV Push every 6 hours  ALBUTerol    90 MICROgram(s) HFA Inhaler 2Puff(s) Inhalation every 6 hours PRN    Drug Dosing Weight  Height (cm): 152.4 (2017 03:24)  Weight (kg): 44.1 (2017 03:24)  BMI (kg/m2): 19 (2017 03:24)  BSA (m2): 1.37 (2017 03:24)    CENTRAL LINE: [x ] YES [ ] NO  LOCATION: Rt Fem Line   DATE INSERTED:   REMOVE: [ ] YES [ ] NO  EXPLAIN:    ZEE: [x ] YES [ ] NO    DATE INSERTED:   REMOVE:  [ ] YES [ x] NO  EXPLAIN: I/O    A-LINE:  [ ] YES [ x] NO  LOCATION:   DATE INSERTED:  REMOVE:  [ ] YES [ ] NO  EXPLAIN:    PMH -reviewed admission note, no change since admission  Heart faliure: acute [ ] chronic [ ] acute or chronic [ ] diastolic [ ] systolic [ ] combied systolic and diastolic[ ]  REGGIE: ATN[ ] renal medullary necrosis [ ] CKD I [ ]CKDII [ ]CKD III [ ]CKD IV [ ]CKD V [ ]Other pathological lesions [ ]  Abdominal Nutrition Status: malnutrition [ ] cachexia [ ] morbid obesity/BMI=40 [ ] Supplement ordered [__]     ICU Vital Signs Last 24 Hrs  T(C): 35.4, Max: 36.5 ( @ 20:58)  T(F): 95.8, Max: 97.7 (12 @ 20:58)  HR: 88 (76 - 97)  BP: 119/44 (79/32 - 122/36)  BP(mean): 61 (44 - 64)  ABP: --  ABP(mean): --  RR: 20 (11 - 35)  SpO2: 99% (86% - 100%)      ABG - ( 2017 05:19 )  pH: 7.19  /  pCO2: 65    /  pO2: 445   / HCO3: 24    / Base Excess: -4.5  /  SaO2: >98           Mode: AC/ CMV (Assist Control/ Continuous Mandatory Ventilation)  RR (machine): 20  TV (machine): 350  FiO2: 35  PEEP: 5  ITime: 1  MAP: 9  PIP: 32      PHYSICAL EXAM:    GENERAL: [X ]NAD, [ ]well-groomed, [ ]well-developed  HEAD:  [X ]Atraumatic, [ ]Normocephalic  EYES: [ ]EOMI, [X ]PERRLA, [ ]conjunctiva and sclera clear  ENMT: [ ]No tonsillar erythema, exudates, or enlargement; [X ]Moist mucous membranes, [ ]Good dentition, [ ]No lesions  NECK: [X ]Supple, normal appearance, [ ]No JVD; [ ]Normal thyroid; [ ]Trachea midline  NERVOUS SYSTEM:  [ ]Alert & Oriented X3, [ ]Good concentration; [ ]Motor Strength 5/5 B/L upper and lower extremities; [ ]DTRs 2+ intact and symmetric- SEDATED  CHEST/LUNG: [ ]No chest deformity; [ ]Normal percussion bilaterally; [X ]No rales, rhonchi, wheezing   HEART: Irregular rate and rhythm; [X ]No murmurs, rubs, or gallops  ABDOMEN: [x ]Soft, Nontender, distended; [ x]Bowel sounds present  EXTREMITIES:  [ ]2+ Peripheral Pulses,- b/l UE and LE ecchymosis and 1 + pedal edema b/l  LYMPH: [ ]No lymphadenopathy noted  SKIN: [ ]No rashes or lesions; [ ]Good capillary refill- b/l UE and LE ecchymosis      LABS:  CBC Full  -  ( 2017 02:52 )  WBC Count : 14.9 K/uL  Hemoglobin : 11.3 g/dL  Hematocrit : 35.9 %  Platelet Count - Automated : 251 K/uL  Mean Cell Volume : 102.4 fl  Mean Cell Hemoglobin : 32.3 pg  Mean Cell Hemoglobin Concentration : 31.6 gm/dL  Auto Neutrophil # : 12.6 K/uL  Auto Lymphocyte # : 0.8 K/uL  Auto Monocyte # : 1.5 K/uL  Auto Eosinophil # : 0.0 K/uL  Auto Basophil # : 0.1 K/uL  Auto Neutrophil % : 84.2 %  Auto Lymphocyte % : 5.2 %  Auto Monocyte % : 9.9 %  Auto Eosinophil % : 0.0 %  Auto Basophil % : 0.5 %        137  |  102  |  17  ----------------------------<  104<H>  4.1   |  26  |  0.58    Ca    8.2<L>      2017 02:52  Phos  3.5       Mg     2.1         TPro  5.8<L>  /  Alb  1.7<L>  /  TBili  0.7  /  DBili  x   /  AST  171<H>  /  ALT  182<H>  /  AlkPhos  95      PT/INR - ( 2017 02:52 )   PT: 33.6 sec;   INR: 3.01 ratio         PTT - ( 2017 02:52 )  PTT:48.5 sec  Urinalysis Basic - ( 2017 13:25 )    Color: Yellow / Appearance: Clear / S.010 / pH: x  Gluc: x / Ketone: Negative  / Bili: Negative / Urobili: 4   Blood: x / Protein: 100 / Nitrite: Positive   Leuk Esterase: Moderate / RBC: 25-50 /HPF / WBC 3-5 /HPF   Sq Epi: x / Non Sq Epi: Few / Bacteria: Moderate /HPF      Culture Results:   No growth to date. ( @ 01:50)  Culture Results:   No growth to date. ( @ 01:50)      RADIOLOGY & ADDITIONAL STUDIES REVIEWED:  CT chest - RLL airspace opacity/effusion, bilat mild bronchiectasis, apical emphysema    [ ]GOALS OF CARE DISCUSSION WITH PATIENT/FAMILY/PROXY:    CRITICAL CARE TIME SPENT: 35 minutes

## 2017-06-13 NOTE — CONSULT NOTE ADULT - PROBLEM SELECTOR RECOMMENDATION 6
1-Anemia profile.  2- iron supplements.  3- Closely monitor H & H.  4- Observe for bleeding.
INR>3 - no chemical a/c for now , protonix

## 2017-06-13 NOTE — CONSULT NOTE ADULT - ATTENDING COMMENTS
[facility] NEPHROLOGY  Henrique Beach M.D.  Yoni Lloyd D.O.  Penelope Frazier M.D.  Argelia Henderson, MSN, ANP-C  [phone]    [address] [place] [address]
Pt seen and examined with PGY 3.  She is an 85 year old female w/ h/o COPD on home O2, DVT, PAF, a/w acute respiratory failure/pneumonia.  Pt was on the floor and developed hypercapnic resp failure for which rapid response was called.  She was transferred to the ICU where I emergently placed a femoral TLC and intubated her.    Imp:  1.  acute hypercapnic resp failure  2.  COPD exacerbation  3.  aspiration pneumonia  4.  shock - likely hypovolemic w/ sedative component s/p intubation    Plan:  1.  mechanical vent support.  prececedex/PRN fentanyl  2.  cont LR for now  3.  vanco/zosyn.  ID following.  blood cultures sent  4.  levophed started to maintain MAP > 65  5.  bronchodilators/IV solumedrol  6.  SUP - protonix  7.  DVT proph - INR therapeutic    Multiple attempts made to call the pt's daughter without response.

## 2017-06-13 NOTE — CONSULT NOTE ADULT - PROBLEM SELECTOR RECOMMENDATION 2
Secondary to aspiration pneumonia. Patient sedated and intubated. On levophed. Continue IV antibiotics. Patient remains a full code.
urine culture  abx per culture  REcommend Bedside SONO in ICU to assess kidneys and r/o alternative etiology for sepsis
1-Bronchodilators.  2-O2 as needed.  3-Pulmonary evaluation and management.  4- Steroids as per primary, and pulmonary team if needed.
In the setting of UTI  repeat UA once infection cleared
-2/2 aspiration pneumonia. s/p 2L bolus + levophed.  -c/w vanc and zosyn. f/u vanc trough.  - f/u blood c/s, urine c/s, sputum c/s.   -  MAP of 70
Bronchodilators  Oxygen supp  Advair diskus 250/50 mcgs po bid  [facility] as outpatient

## 2017-06-13 NOTE — CONSULT NOTE ADULT - CONSULT REQUESTED DATE/TIME
12-Jun-2017
12-Jun-2017 10:00
12-Jun-2017 10:20
13-Jun-2017 03:07
13-Jun-2017 11:50
13-Jun-2017 13:27
12-Jun-2017 13:50

## 2017-06-13 NOTE — PROGRESS NOTE ADULT - ASSESSMENT
85 year old female from home lives with daughter PMH of Lupus anticoagulant DVT b/l 15 years ago on coumadin , PAF on amiodarone , htn , hld  copd on 3 liter n/c at home presents to the ER with complaints of feeling malaise , SOB and productive cough for the past 2 days is being admitted for pna. 85 year old female from home lives with daughter PMH of Lupus anticoagulant DVT b/l 15 years ago on coumadin , PAF on amiodarone , htn , hld  copd on 3 liter n/c at home presents to the ER with complaints of feeling malaise , SOB and productive cough admitted for pneumonia s/p RRT for Resp distress admitted to ICU for Hypoxic Hypercapneic Resp Failure 2/2 Aspiration Pneumonia and COPD exacerbation.

## 2017-06-13 NOTE — PROGRESS NOTE ADULT - SUBJECTIVE AND OBJECTIVE BOX
Patient is a 85y old  Female who presents with a chief complaint of with acute resp distress overnight likely 2/2 COPD exacerbation and aspiration pneumonia.   - AB.2/77/60 on 50% VM. emergency intubation and central line placed, precedex for sedation. fentanyl PRN pushes.    pt seen in icu [ x ], reg med floor [   ], bed [x  ], chair at bedside [   ], a+o x3 [  ], sedated [ x ],  nad [ x ]    ramirez [x  ], ngt [ x ], peg [  ], et tube [ x ], cent line [ x ], picc line [  ]        Allergies    No Known Allergies        Vitals    T(F): 101.7, Max: 101.7 ( @ 12:30)  HR: 114 (80 - 114)  BP: 94/44 (77/47 - 122/36)  RR: 30 (11 - 35)  SpO2: 97% (86% - 100%)  Wt(kg): --  CAPILLARY BLOOD GLUCOSE      Labs                          10.5   19.8  )-----------( 311      ( 2017 13:58 )             33.5           137  |  104  |  23<H>  ----------------------------<  176<H>  4.2   |  20<L>  |  1.09    Ca    7.7<L>      2017 13:58  Phos  2.8       Mg     1.9         TPro  5.8<L>  /  Alb  1.7<L>  /  TBili  0.6  /  DBili  x   /  AST  182<H>  /  ALT  187<H>  /  AlkPhos  96        CARDIAC MARKERS ( 2017 13:58 )  0.029 ng/mL / x     / 25 U/L / x     / x      CARDIAC MARKERS ( 2017 02:52 )  <0.015 ng/mL / x     / 36 U/L / x     / 1.1 ng/mL        .Sputum Sputum - TRAP   @ 12:36 --  --    No squamous epithelial cells per low power field  Moderate White blood cells per low power field  No organisms seen per oil power field      .Blood Blood   @ 01:50   No growth to date.  --  --          Radiology Results      Meds    MEDICATIONS  (STANDING):  amiodarone    Tablet 200milliGRAM(s) Oral daily  latanoprost 0.005% Ophthalmic Solution 1Drop(s) Both EYES at bedtime  piperacillin/tazobactam IVPB. 3.375Gram(s) IV Intermittent every 8 hours  vancomycin  IVPB 750milliGRAM(s) IV Intermittent every 12 hours  norepinephrine Infusion 0.5MICROgram(s)/kG/Min IV Continuous <Continuous>  dexmedetomidine Infusion 0.4MICROgram(s)/kG/Hr IV Continuous <Continuous>  pantoprazole  Injectable 40milliGRAM(s) IV Push daily  lactated ringers. 1000milliLiter(s) IV Continuous <Continuous>  methylPREDNISolone sodium succinate Injectable 40milliGRAM(s) IV Push every 6 hours      MEDICATIONS  (PRN):  ondansetron Injectable 4milliGRAM(s) IV Push every 6 hours PRN Nausea and/or Vomiting  fentaNYL    Injectable 50MICROGram(s) IV Push every 6 hours PRN sedation  ALBUTerol    90 MICROgram(s) HFA Inhaler 2Puff(s) Inhalation every 6 hours PRN Bronchospasm  acetaminophen    Suspension 650milliGRAM(s) Oral every 6 hours PRN For Temp greater than 38 C (100.4 F)      Physical Exam    Neuro :  no focal deficits  Respiratory:decrease breath sounds right lower lobe   CV: RRR, S1S2, no murmurs,   Abdominal: Soft, NT, ND +BS,  Extremities: No edema, + peripheral pulses,  left leg ecchimosis and dry skin scales    ASSESSMENT    Acute respiratory failure with hypoxia and hypercapnia likely 2/2 COPD exacerbation and aspiration pneumonia.   pneumonia possible 2nd to aspiration,   sepsis,   microscopic hematuria,   proteinuria,   transaminitis,   cholelithiasis,    h/o PAF on amiodarone ,   Pneumonitis due to inhalation of food or vomitus  DVT (deep venous thrombosis) on coumadin  Lupus anticoagulant disorder  htn ,   hld    copd on 3 liter n/c at home      PLAN    f/u echo  ABG repeat and adjust fio2. IV steroids.  pulm f/u noted  id f/u noted  cont vanco and zosyn  cont steroids  renal f/u noted  urology f/u noted  cont current meds  mgmt as per icu

## 2017-06-13 NOTE — PROGRESS NOTE ADULT - SUBJECTIVE AND OBJECTIVE BOX
Patient is a 85y old  Female who presents with a chief complaint of malaise, cough, sob, runny nose x2 days. Cough productive of thick yellowish sputum, 1 tbsp each time. Subjective fever and chills. Sick contacts: 1 grand child with URI.  Former smoker, 1 pack/day/20 years. Quit 20 years ago. Pt has h/o COPD on home oxygen 3 lts.  Patient presented yesterday with respiratory distress and hypoxia, transferred to ICU. Patient was sedated, intubated for Hypercapnic Respiratory failure, Central Arterial line present.    INTERVAL HPI/OVERNIGHT EVENTS:      VITAL SIGNS:  T(F): 95.8  HR: 107  BP: 97/38  RR: 31  SpO2: 97%  Wt(kg): --  I&O's Detail  I & Os for 24h ending 2017 07:00  =============================================  IN:    Sodium Chloride 0.9% IV Bolus: 2000 ml    lactated ringers.: 300 ml    norepinephrine Infusion: 165.2 ml    IV PiggyBack: 100 ml    dexmedetomidine Infusion: 23.1 ml    Total IN: 2588.3 ml  ---------------------------------------------  OUT:    Indwelling Catheter - Urethral: 600 ml    Total OUT: 600 ml  ---------------------------------------------  Total NET: 1988.3 ml    I & Os for current day (as of 2017 10:48)  =============================================  IN:    lactated ringers.: 200 ml    norepinephrine Infusion: 82.6 ml    dexmedetomidine Infusion: 9.9 ml    Total IN: 292.5 ml  ---------------------------------------------  OUT:    Total OUT: 0 ml  ---------------------------------------------  Total NET: 292.5 ml    Mode: AC/ CMV (Assist Control/ Continuous Mandatory Ventilation)  RR (machine): 20  TV (machine): 350  FiO2: 35  PEEP: 5  ITime: 1  MAP: 10  PIP: 27        REVIEW OF SYSTEMS:    CONSTITUTIONAL:  No fevers, chills, sweats    HEENT:  Eyes:  No diplopia or blurred vision. ENT:  No earache, sore throat or runny nose.    CARDIOVASCULAR:  No pressure, squeezing, tightness, or heaviness about the chest; no palpitations.    RESPIRATORY:  Per HPI    GASTROINTESTINAL:  No abdominal pain, nausea, vomiting or diarrhea.    GENITOURINARY:  No dysuria, frequency or urgency.    NEUROLOGIC:  No paresthesias, fasciculations, seizures or weakness.    PSYCHIATRIC:  No disorder of thought or mood.      PHYSICAL EXAM:    Constitutional: Well developed and nourished  Eyes:Perrla  ENMT: normal  Neck:supple  Respiratory: good air entry  Cardiovascular: S1 S2 regular  Gastrointestinal: Soft, Non tender  Extremities: No edema  Vascular:normal  Neurological:Awake, alert,Ox3  Musculoskeletal:Normal      MEDICATIONS  (STANDING):  amiodarone    Tablet 200milliGRAM(s) Oral daily  latanoprost 0.005% Ophthalmic Solution 1Drop(s) Both EYES at bedtime  piperacillin/tazobactam IVPB. 3.375Gram(s) IV Intermittent every 8 hours  vancomycin  IVPB 750milliGRAM(s) IV Intermittent every 12 hours  norepinephrine Infusion 0.5MICROgram(s)/kG/Min IV Continuous <Continuous>  dexmedetomidine Infusion 0.4MICROgram(s)/kG/Hr IV Continuous <Continuous>  pantoprazole  Injectable 40milliGRAM(s) IV Push daily  lactated ringers. 1000milliLiter(s) IV Continuous <Continuous>  methylPREDNISolone sodium succinate Injectable 40milliGRAM(s) IV Push every 6 hours    MEDICATIONS  (PRN):  ondansetron Injectable 4milliGRAM(s) IV Push every 6 hours PRN Nausea and/or Vomiting  fentaNYL    Injectable 50MICROGram(s) IV Push every 6 hours PRN sedation  ALBUTerol    90 MICROgram(s) HFA Inhaler 2Puff(s) Inhalation every 6 hours PRN Bronchospasm      Allergies    No Known Allergies    Intolerances        LABS:                        11.3   14.9  )-----------( 251      ( 2017 02:52 )             35.9     06-13    137  |  102  |  17  ----------------------------<  104<H>  4.1   |  26  |  0.58    Ca    8.2<L>      2017 02:52  Phos  3.5     06-13  Mg     2.1     06-13    TPro  5.8<L>  /  Alb  1.7<L>  /  TBili  0.7  /  DBili  x   /  AST  171<H>  /  ALT  182<H>  /  AlkPhos  95  06-12    PT/INR - ( 2017 02:52 )   PT: 33.6 sec;   INR: 3.01 ratio         PTT - ( 2017 02:52 )  PTT:48.5 sec  Urinalysis Basic - ( 2017 13:25 )    Color: Yellow / Appearance: Clear / S.010 / pH: x  Gluc: x / Ketone: Negative  / Bili: Negative / Urobili: 4   Blood: x / Protein: 100 / Nitrite: Positive   Leuk Esterase: Moderate / RBC: 25-50 /HPF / WBC 3-5 /HPF   Sq Epi: x / Non Sq Epi: Few / Bacteria: Moderate /HPF      ABG - ( 2017 05:19 )  pH: 7.19  /  pCO2: 65    /  pO2: 445   / HCO3: 24    / Base Excess: -4.5  /  SaO2: >98               CARDIAC MARKERS ( 2017 02:52 )  <0.015 ng/mL / x     / 36 U/L / x     / 1.1 ng/mL  CARDIAC MARKERS ( 2017 11:14 )  <0.015 ng/mL / x     / x     / x     / x          CAPILLARY BLOOD GLUCOSE    pro-bnp 1482  @ 12:32     d-dimer --   @ 12:32      RADIOLOGY & ADDITIONAL TESTS:    CXR:  IMPRESSION:  Studylimited due to rotation. ET tube noted tip approximately 2.1 cm   from giuseppe. New feeding tube noted tip overlying the GE junction at the   level of the diaphragm. Repositioning recommended.    Increased interstitial lung markings without significant change. No   pneumothorax.    Small bilateral pleural effusions with adjacent airspace opacities   without significant change.    IVC filter present.    Heart size cannot be accurately assessed in this projection.    ICU notified 2017 at 8:33 AM        TOMMY BURNETTE M.D., ATTENDING RADIOLOGIST  This document has been electronically signed. 2017  8:34AM    Ct scan chest:    ekg;    echo: Patient is a 85y old  Female who presents with a chief complaint of malaise, cough, sob, runny nose x2 days. Cough productive of thick yellowish sputum, 1 tbsp each time. Subjective fever and chills. Sick contacts: 1 grand child with URI.  Former smoker, 1 pack/day/20 years. Quit 20 years ago. Pt has h/o COPD on home oxygen 3 lts.  Patient presented yesterday with respiratory distress and hypoxia, transferred to ICU. Patient was sedated, intubated for Hypercapnic Respiratory failure, Central Arterial line present.    INTERVAL HPI/OVERNIGHT EVENTS:      VITAL SIGNS:  T(F): 95.8  HR: 107  BP: 97/38  RR: 31  SpO2: 97%  Wt(kg): --  I&O's Detail  I & Os for 24h ending 2017 07:00  =============================================  IN:    Sodium Chloride 0.9% IV Bolus: 2000 ml    lactated ringers.: 300 ml    norepinephrine Infusion: 165.2 ml    IV PiggyBack: 100 ml    dexmedetomidine Infusion: 23.1 ml    Total IN: 2588.3 ml  ---------------------------------------------  OUT:    Indwelling Catheter - Urethral: 600 ml    Total OUT: 600 ml  ---------------------------------------------  Total NET: 1988.3 ml    I & Os for current day (as of 2017 10:48)  =============================================  IN:    lactated ringers.: 200 ml    norepinephrine Infusion: 82.6 ml    dexmedetomidine Infusion: 9.9 ml    Total IN: 292.5 ml  ---------------------------------------------  OUT:    Total OUT: 0 ml  ---------------------------------------------  Total NET: 292.5 ml    Mode: AC/ CMV (Assist Control/ Continuous Mandatory Ventilation)  RR (machine): 20  TV (machine): 350  FiO2: 35  PEEP: 5  ITime: 1  MAP: 10  PIP: 27        REVIEW OF SYSTEMS:    CONSTITUTIONAL:  No fevers, chills, sweats    HEENT:  Eyes:  No diplopia or blurred vision. ENT:  No earache, sore throat or runny nose.    CARDIOVASCULAR:  No pressure, squeezing, tightness, or heaviness about the chest; no palpitations.    RESPIRATORY:  Per HPI    GASTROINTESTINAL:  No abdominal pain, nausea, vomiting or diarrhea.    GENITOURINARY:  No dysuria, frequency or urgency.    NEUROLOGIC:  No paresthesias, fasciculations, seizures or weakness.    PSYCHIATRIC:  No disorder of thought or mood.      PHYSICAL EXAM:    Constitutional: Well developed and nourished  Eyes:Perrla  ENMT: normal  Neck:supple  Respiratory: Decreased BS on both bases  Cardiovascular: S1 S2 regular  Gastrointestinal: Soft, Non tender  Extremities: Edema of exts  Vascular:normal  Neurological:Awake, alert,Ox3  Musculoskeletal:Normal      MEDICATIONS  (STANDING):  amiodarone    Tablet 200milliGRAM(s) Oral daily  latanoprost 0.005% Ophthalmic Solution 1Drop(s) Both EYES at bedtime  piperacillin/tazobactam IVPB. 3.375Gram(s) IV Intermittent every 8 hours  vancomycin  IVPB 750milliGRAM(s) IV Intermittent every 12 hours  norepinephrine Infusion 0.5MICROgram(s)/kG/Min IV Continuous <Continuous>  dexmedetomidine Infusion 0.4MICROgram(s)/kG/Hr IV Continuous <Continuous>  pantoprazole  Injectable 40milliGRAM(s) IV Push daily  lactated ringers. 1000milliLiter(s) IV Continuous <Continuous>  methylPREDNISolone sodium succinate Injectable 40milliGRAM(s) IV Push every 6 hours    MEDICATIONS  (PRN):  ondansetron Injectable 4milliGRAM(s) IV Push every 6 hours PRN Nausea and/or Vomiting  fentaNYL    Injectable 50MICROGram(s) IV Push every 6 hours PRN sedation  ALBUTerol    90 MICROgram(s) HFA Inhaler 2Puff(s) Inhalation every 6 hours PRN Bronchospasm      Allergies    No Known Allergies    Intolerances        LABS:                        11.3   14.9  )-----------( 251      ( 2017 02:52 )             35.9     06-13    137  |  102  |  17  ----------------------------<  104<H>  4.1   |  26  |  0.58    Ca    8.2<L>      2017 02:52  Phos  3.5     06-13  Mg     2.1     06-13    TPro  5.8<L>  /  Alb  1.7<L>  /  TBili  0.7  /  DBili  x   /  AST  171<H>  /  ALT  182<H>  /  AlkPhos  95  06-12    PT/INR - ( 2017 02:52 )   PT: 33.6 sec;   INR: 3.01 ratio         PTT - ( 2017 02:52 )  PTT:48.5 sec  Urinalysis Basic - ( 2017 13:25 )    Color: Yellow / Appearance: Clear / S.010 / pH: x  Gluc: x / Ketone: Negative  / Bili: Negative / Urobili: 4   Blood: x / Protein: 100 / Nitrite: Positive   Leuk Esterase: Moderate / RBC: 25-50 /HPF / WBC 3-5 /HPF   Sq Epi: x / Non Sq Epi: Few / Bacteria: Moderate /HPF      ABG - ( 2017 05:19 )  pH: 7.19  /  pCO2: 65    /  pO2: 445   / HCO3: 24    / Base Excess: -4.5  /  SaO2: >98               CARDIAC MARKERS ( 2017 02:52 )  <0.015 ng/mL / x     / 36 U/L / x     / 1.1 ng/mL  CARDIAC MARKERS ( 2017 11:14 )  <0.015 ng/mL / x     / x     / x     / x          CAPILLARY BLOOD GLUCOSE    pro-bnp 1482  @ 12:32     d-dimer --   @ 12:32      RADIOLOGY & ADDITIONAL TESTS:    CXR:  IMPRESSION:  Studylimited due to rotation. ET tube noted tip approximately 2.1 cm   from giuseppe. New feeding tube noted tip overlying the GE junction at the   level of the diaphragm. Repositioning recommended.    Increased interstitial lung markings without significant change. No   pneumothorax.    Small bilateral pleural effusions with adjacent airspace opacities   without significant change.    IVC filter present.    Heart size cannot be accurately assessed in this projection.    ICU notified 2017 at 8:33 AM        TOMMY BURNETTE M.D., ATTENDING RADIOLOGIST  This document has been electronically signed. 2017  8:34AM    Ct scan chest:    ekg;    echo:

## 2017-06-13 NOTE — PROCEDURE NOTE - NSPROCDETAILS_GEN_ALL_CORE
patient pre-oxygenated, tube inserted, placement confirmed/connected to ventilator
guidewire recovered/ultrasound guidance/sterile technique, catheter placed/lumen(s) aspirated and flushed/sterile dressing applied

## 2017-06-13 NOTE — CONSULT NOTE ADULT - PROBLEM SELECTOR RECOMMENDATION 9
- likely 2/2 COPD exacerbation and aspiration pneumonia.   - AB.2//60 on 50% VM. emergency intubation and central line placement.   - precedex for sedation. fentanyl PRN pushes.   - ABG repeat and adjust fio2. IV steroids. C/w antibiotics. - likely 2/2 COPD exacerbation and aspiration pneumonia.   - AB.2/60 on 50% VM. emergency intubation and central line placement.   - precedex for sedation. fentanyl PRN pushes. f/u echo  - ABG repeat and adjust fio2. IV steroids. C/w antibiotics.

## 2017-06-13 NOTE — CONSULT NOTE ADULT - ASSESSMENT
85 y.o. F with microscopic hematuria likely secondary to UTI
PNA per hx  UTI  renal cysts, unable to completely assess with present study.  CT abd ordered, but pt unstable for transfer to CT
Patint with history of COPD, P AFIB, HTN, HLD, and DVT presented with symptoms and signs for Pneumonia,  Work up suggested for Pneumonia, mucous plugging and chololithiasis.  Patient was started in IV Vancomycin and Zosyn.
85y Female with h/o DVT, Lupus anticoagulant on A/C, Parox. AFib, HTN, COPD on home O2, HLD p/w SOB and cough. Pt a/w PNA. Renal consulted for proteinuria.
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 pt was admitted with aspiration pneumonia on vanc and zosyn. RRt called for respiratory distress. pt noted to be tachypneic and gasping and crackles noted on clinical exam. Pt received 40 IV lasix and ramirez placed which drained 1 litre of urine. Pt brought to ICU for further management. Daughter attempted to be contacted multiple times with no response, for now pt remains full code.

## 2017-06-13 NOTE — CONSULT NOTE ADULT - SUBJECTIVE AND OBJECTIVE BOX
85 year old female from home lives with daughter PMH of Lupus anticoagulant DVT b/l 15 years ago on coumadin , PAF on amiodarone , htn , hld  copd on 3 liter n/c at home presents to the ER with complaints of feeling malaise , SOB and productive cough for the past 2 days , patients states that she was in her usual state of health 2 days ago when she started getting naseous ( denies any vomiting) , she also had a runny nose and malaise claims the symptoms were similiar to when she had PNA , over the course of the next 2 days she developed productive cough , thick yellowish sputum about a tablespoon full each time she would cough , patient also noted subjective feve with chills , and decided to come to the hospital . CP, no abdominal pain, no vomiting, no urinary difficulties , denies any recent travel . , has a grand child who had URI like symptoms . off note patient also notes.Pt had a mechanical fall last week without head strike and bruised her L calf. ROS is negative except above  Pt developed sxs sepsis and transfered to ICU, Pt on pressors presently. Intubated. Hx from chart/medical team  No hx gross hematuria.    Ct reviewed with radiologist, to assess kidneys from CT chest previously ordered. Multiple cysts seen, unable to adequately visualize LK inferiorly.

## 2017-06-13 NOTE — CONSULT NOTE ADULT - SUBJECTIVE AND OBJECTIVE BOX
age female c/c    PMH/PSH: DVT (deep venous thrombosis)  Lupus anticoagulant disorder  No significant past surgical history  ,   Allergy: No Known Allergies  ,   Social history: ,   Family History:   Anesthesia reaction (prior) -    ECHO/ stress test: .   Colonoscopy/EGD- .   Code status:     Meds at home:  Meds in hospital:  tiotropium 18 MICROgram(s) Capsule 1Capsule(s) Inhalation daily  pantoprazole    Tablet 40milliGRAM(s) Oral before breakfast  amiodarone    Tablet 200milliGRAM(s) Oral daily  diltiazem   CD 300milliGRAM(s) Oral daily  latanoprost 0.005% Ophthalmic Solution 1Drop(s) Both EYES at bedtime  sodium chloride 0.9%. 1000milliLiter(s) IV Continuous <Continuous>  ALBUTerol/ipratropium for Nebulization 3milliLiter(s) Nebulizer every 6 hours  piperacillin/tazobactam IVPB. 3.375Gram(s) IV Intermittent every 8 hours  vancomycin  IVPB 750milliGRAM(s) IV Intermittent every 12 hours  acetaminophen   Tablet. 650milliGRAM(s) Oral every 6 hours PRN  ondansetron Injectable 4milliGRAM(s) IV Push every 6 hours PRN  ALPRAZolam 0.25milliGRAM(s) Oral at bedtime      VITALS:  T(F): 97.7, Max: 97.7 ( @ 20:58)  HR: 84  BP: 109/66  RR: 32  SpO2: 89%  Wt(kg): --    I & Os for current day (as of  @ 03:07)  =============================================  IN: 0 ml / OUT: 160 ml / NET: -160 ml        LABS:                        11.3   14.9  )-----------( 251      ( 2017 02:52 )             35.9         138  |  104  |  12  ----------------------------<  82  3.7   |  27  |  0.46<L>    Ca    7.9<L>      2017 06:32  Phos  2.5       Mg     2.1         TPro  5.8<L>  /  Alb  1.7<L>  /  TBili  0.7  /  DBili  x   /  AST  171<H>  /  ALT  182<H>  /  AlkPhos  95      PT/INR - ( 2017 02:52 )   PT: 33.6 sec;   INR: 3.01 ratio         PTT - ( 2017 02:52 )  PTT:48.5 sec  Vitamin D, 25-Hydroxy: 8.0 ng/mL ( @ 10:30)   @ 06:3284  Serum Pro-Brain Natriuretic Peptide: 1482 pg/mL ( @ 12:32)    CAPILLARY BLOOD GLUCOSE    ABG - ( 2017 02:42 )  pH: 7.20  /  pCO2: 77    /  pO2: 60    / HCO3: 29    / Base Excess: 0.2   /  SaO2: 88              Urinalysis Basic - ( 2017 13:25 )    Color: Yellow / Appearance: Clear / S.010 / pH: x  Gluc: x / Ketone: Negative  / Bili: Negative / Urobili: 4   Blood: x / Protein: 100 / Nitrite: Positive   Leuk Esterase: Moderate / RBC: 25-50 /HPF / WBC 3-5 /HPF   Sq Epi: x / Non Sq Epi: Few / Bacteria: Moderate /HPF      Culture Results:   No growth to date. ( @ 01:50)  Culture Results:   No growth to date. ( @ 01:50)      REVIEW OF SYSTEMS:  CONSTITUTIONAL: No fever, weight loss, or fatigue  EYES: No eye pain, visual disturbances, or discharge  ENMT:  No difficulty hearing, tinnitus, vertigo; No sinus or throat pain  NECK: No pain or stiffness  BREASTS: No pain, masses, or nipple discharge  RESPIRATORY: No cough, wheezing, chills or hemoptysis; No shortness of breath  CARDIOVASCULAR: No chest pain, palpitations, dizziness, or leg swelling  GASTROINTESTINAL: No abdominal or epigastric pain. No nausea, vomiting, or hematemesis; No diarrhea or constipation. No melena or hematochezia.  GENITOURINARY: No dysuria, frequency, hematuria, or incontinence  NEUROLOGICAL: No headaches, memory loss, loss of strength, numbness, or tremors  SKIN: No itching, burning, rashes, or lesions   LYMPH NODES: No enlarged glands  ENDOCRINE: No heat or cold intolerance; No hair loss  MUSCULOSKELETAL: No joint pain or swelling; No muscle, back, or extremity pain  PSYCHIATRIC: No depression, anxiety, mood swings, or difficulty sleeping  HEME/LYMPH: No easy bruising, or bleeding gums  ALLERY AND IMMUNOLOGIC: No hives or eczema    RADIOLOGY & ADDITIONAL TESTS:    1. CXR:   2. USG liver:  3. CT abdomen:     Imaging Personally Reviewed:  [+ ] YES  [ ] NO    Consultant(s) Notes Reviewed:  [+ ] YES  [ ] NO    PHYSICAL EXAM:  GENERAL: NAD, well-groomed, well-developed  HEAD:  Atraumatic, Normocephalic  EYES: EOMI, PERRLA, conjunctiva and sclera clear  ENMT: No tonsillar erythema, exudates, or enlargement; Moist mucous membranes, Good dentition, No lesions  NECK: Supple, No JVD, Normal thyroid  NERVOUS SYSTEM:  MS: AAOx3, speech fluid, comprehension intact, CN: PERRL, EOMI, VFF, V1-V3 b/l Intact, face symmetric, tongue midline, palate symmetric, hearing intact to external rub/whisper bilateral, neck supple, Motor: 5/5 power, negative drift, normal tone  Sensory: sensation grossly intact, Coordination: FNF, HTS, HERNESTO intact, DTR: 2+, toes down, Gait: nl base, stride, lily. Able to heel/toe/tandem  CHEST/LUNG: Clear to percussion bilaterally; No rales, rhonchi, wheezing, or rubs  HEART: Regular rate and rhythm; No murmurs, rubs, or gallops  ABDOMEN: Soft, Nontender, Nondistended; Bowel sounds present  EXTREMITIES:  2+ Peripheral Pulses, No clubbing, cyanosis, or edema  LYMPH: No lymphadenopathy noted  SKIN: No rashes or lesions       Care Discussed with Consultants/Other Providers [ ] YES  [ ] NO 85 year old female from home lives with daughter PMH of Lupus anticoagulant DVT b/l 15 years ago on coumadin , PAF on amiodarone , htn , hld  copd on 3 liter n/c at home presents to the ER with complaints of feeling malaise , SOB and productive cough for the past 2 days , patients states that she was in her usual state of health 2 days ago when she started getting naseous ( denies any vomiting) , she also had a runny nose and malaise claims the symptoms were similiar to when she had PNA , over the course of the next 2 days she developed productive cough , thick yellowish sputum about a tablespoon full each time she would cough , patient also noted subjective feve with chills , and decided to come to the hospital . CP, no abdominal pain, no vomiting, no urinary difficulties , denies any recent travel . , has a grand child who had URI like symptoms . off note patient also notes.Pt had a mechanical fall last week without head strike and bruised her L calf. ROS is negative except above.  PMH/PSH: DVT (deep venous thrombosis)  Lupus anticoagulant disorder  No significant past surgical history  ,   Allergy: No Known Allergies  ,   Social history: ,   Family History:   Anesthesia reaction (prior) -    ECHO/ stress test: .   Colonoscopy/EGD- .   Code status:     Meds at home:  Meds in hospital:  tiotropium 18 MICROgram(s) Capsule 1Capsule(s) Inhalation daily  pantoprazole    Tablet 40milliGRAM(s) Oral before breakfast  amiodarone    Tablet 200milliGRAM(s) Oral daily  diltiazem   CD 300milliGRAM(s) Oral daily  latanoprost 0.005% Ophthalmic Solution 1Drop(s) Both EYES at bedtime  sodium chloride 0.9%. 1000milliLiter(s) IV Continuous <Continuous>  ALBUTerol/ipratropium for Nebulization 3milliLiter(s) Nebulizer every 6 hours  piperacillin/tazobactam IVPB. 3.375Gram(s) IV Intermittent every 8 hours  vancomycin  IVPB 750milliGRAM(s) IV Intermittent every 12 hours  acetaminophen   Tablet. 650milliGRAM(s) Oral every 6 hours PRN  ondansetron Injectable 4milliGRAM(s) IV Push every 6 hours PRN  ALPRAZolam 0.25milliGRAM(s) Oral at bedtime      VITALS:  T(F): 97.7, Max: 97.7 ( @ 20:58)  HR: 84  BP: 109/66  RR: 32  SpO2: 89%  Wt(kg): --    I & Os for current day (as of  @ 03:07)  =============================================  IN: 0 ml / OUT: 160 ml / NET: -160 ml        LABS:                        11.3   14.9  )-----------( 251      ( 2017 02:52 )             35.9         138  |  104  |  12  ----------------------------<  82  3.7   |  27  |  0.46<L>    Ca    7.9<L>      2017 06:32  Phos  2.5       Mg     2.1         TPro  5.8<L>  /  Alb  1.7<L>  /  TBili  0.7  /  DBili  x   /  AST  171<H>  /  ALT  182<H>  /  AlkPhos  95      PT/INR - ( 2017 02:52 )   PT: 33.6 sec;   INR: 3.01 ratio         PTT - ( 2017 02:52 )  PTT:48.5 sec  Vitamin D, 25-Hydroxy: 8.0 ng/mL ( @ 10:30)   @ 06:3284  Serum Pro-Brain Natriuretic Peptide: 1482 pg/mL ( @ 12:32)    CAPILLARY BLOOD GLUCOSE    ABG - ( 2017 02:42 )  pH: 7.20  /  pCO2: 77    /  pO2: 60    / HCO3: 29    / Base Excess: 0.2   /  SaO2: 88              Urinalysis Basic - ( 2017 13:25 )    Color: Yellow / Appearance: Clear / S.010 / pH: x  Gluc: x / Ketone: Negative  / Bili: Negative / Urobili: 4   Blood: x / Protein: 100 / Nitrite: Positive   Leuk Esterase: Moderate / RBC: 25-50 /HPF / WBC 3-5 /HPF   Sq Epi: x / Non Sq Epi: Few / Bacteria: Moderate /HPF      Culture Results:   No growth to date. ( @ 01:50)  Culture Results:   No growth to date. ( @ 01:50)      REVIEW OF SYSTEMS:  CONSTITUTIONAL: No fever, weight loss, or fatigue  EYES: No eye pain, visual disturbances, or discharge  ENMT:  No difficulty hearing, tinnitus, vertigo; No sinus or throat pain  NECK: No pain or stiffness  BREASTS: No pain, masses, or nipple discharge  RESPIRATORY: No cough, wheezing, chills or hemoptysis; No shortness of breath  CARDIOVASCULAR: No chest pain, palpitations, dizziness, or leg swelling  GASTROINTESTINAL: No abdominal or epigastric pain. No nausea, vomiting, or hematemesis; No diarrhea or constipation. No melena or hematochezia.  GENITOURINARY: No dysuria, frequency, hematuria, or incontinence  NEUROLOGICAL: No headaches, memory loss, loss of strength, numbness, or tremors  SKIN: No itching, burning, rashes, or lesions   LYMPH NODES: No enlarged glands  ENDOCRINE: No heat or cold intolerance; No hair loss  MUSCULOSKELETAL: No joint pain or swelling; No muscle, back, or extremity pain  PSYCHIATRIC: No depression, anxiety, mood swings, or difficulty sleeping  HEME/LYMPH: No easy bruising, or bleeding gums  ALLERY AND IMMUNOLOGIC: No hives or eczema    RADIOLOGY & ADDITIONAL TESTS:    1. CXR:   2. USG liver:  3. CT abdomen:     Imaging Personally Reviewed:  [+ ] YES  [ ] NO    Consultant(s) Notes Reviewed:  [+ ] YES  [ ] NO    PHYSICAL EXAM:  GENERAL: NAD, well-groomed, well-developed  HEAD:  Atraumatic, Normocephalic  EYES: EOMI, PERRLA, conjunctiva and sclera clear  ENMT: No tonsillar erythema, exudates, or enlargement; Moist mucous membranes, Good dentition, No lesions  NECK: Supple, No JVD, Normal thyroid  NERVOUS SYSTEM:  MS: AAOx3, speech fluid, comprehension intact, CN: PERRL, EOMI, VFF, V1-V3 b/l Intact, face symmetric, tongue midline, palate symmetric, hearing intact to external rub/whisper bilateral, neck supple, Motor: 5/5 power, negative drift, normal tone  Sensory: sensation grossly intact, Coordination: FNF, HTS, HERNESTO intact, DTR: 2+, toes down, Gait: nl base, stride, lily. Able to heel/toe/tandem  CHEST/LUNG: Clear to percussion bilaterally; No rales, rhonchi, wheezing, or rubs  HEART: Regular rate and rhythm; No murmurs, rubs, or gallops  ABDOMEN: Soft, Nontender, Nondistended; Bowel sounds present  EXTREMITIES:  2+ Peripheral Pulses, No clubbing, cyanosis, or edema  LYMPH: No lymphadenopathy noted  SKIN: No rashes or lesions       Care Discussed with Consultants/Other Providers [ ] YES  [ ] NO 85 year old female from home lives with daughter PMH of Lupus anticoagulant DVT b/l 15 years ago on coumadin , PAF on amiodarone , htn , hld  copd on 3 liter n/c at home presented with sob and noted to have aspirated material in the CT chest done in ER. pt was admitted with aspiration pneumonia on vanc and zosyn. RRt called for respiratory distress. pt noted to be tachypneic and gasping and crackles noted on clinical exam. Pt received 40 IV lasix and ramirez placed which drained 1 litre of urine. Pt brought to ICU for further management. Daughter attempted to be contacted multiple times with no response, for now pt remains full code.       PMH/PSH: DVT (deep venous thrombosis)  Lupus anticoagulant disorder  No significant past surgical history  ,   Allergy: No Known Allergies  ,   Social history: ,   Family History:   Anesthesia reaction (prior) -    ECHO/ stress test: .   Colonoscopy/EGD- .   Code status:     Meds at home:  Meds in hospital:  tiotropium 18 MICROgram(s) Capsule 1Capsule(s) Inhalation daily  pantoprazole    Tablet 40milliGRAM(s) Oral before breakfast  amiodarone    Tablet 200milliGRAM(s) Oral daily  diltiazem   CD 300milliGRAM(s) Oral daily  latanoprost 0.005% Ophthalmic Solution 1Drop(s) Both EYES at bedtime  sodium chloride 0.9%. 1000milliLiter(s) IV Continuous <Continuous>  ALBUTerol/ipratropium for Nebulization 3milliLiter(s) Nebulizer every 6 hours  piperacillin/tazobactam IVPB. 3.375Gram(s) IV Intermittent every 8 hours  vancomycin  IVPB 750milliGRAM(s) IV Intermittent every 12 hours  acetaminophen   Tablet. 650milliGRAM(s) Oral every 6 hours PRN  ondansetron Injectable 4milliGRAM(s) IV Push every 6 hours PRN  ALPRAZolam 0.25milliGRAM(s) Oral at bedtime      VITALS:  T(F): 97.7, Max: 97.7 ( @ 20:58)  HR: 84  BP: 109/66  RR: 32  SpO2: 89%  Wt(kg): --    I & Os for current day (as of  @ 03:07)  =============================================  IN: 0 ml / OUT: 160 ml / NET: -160 ml        LABS:                        11.3   14.9  )-----------( 251      ( 2017 02:52 )             35.9     -12    138  |  104  |  12  ----------------------------<  82  3.7   |  27  |  0.46<L>    Ca    7.9<L>      2017 06:32  Phos  2.5       Mg     2.1         TPro  5.8<L>  /  Alb  1.7<L>  /  TBili  0.7  /  DBili  x   /  AST  171<H>  /  ALT  182<H>  /  AlkPhos  95  12    PT/INR - ( 2017 02:52 )   PT: 33.6 sec;   INR: 3.01 ratio         PTT - ( 2017 02:52 )  PTT:48.5 sec  Vitamin D, 25-Hydroxy: 8.0 ng/mL ( @ 10:30)   @ 06:3284  Serum Pro-Brain Natriuretic Peptide: 1482 pg/mL ( @ 12:32)    CAPILLARY BLOOD GLUCOSE    ABG - ( 2017 02:42 )  pH: 7.20  /  pCO2: 77    /  pO2: 60    / HCO3: 29    / Base Excess: 0.2   /  SaO2: 88              Urinalysis Basic - ( 2017 13:25 )    Color: Yellow / Appearance: Clear / S.010 / pH: x  Gluc: x / Ketone: Negative  / Bili: Negative / Urobili: 4   Blood: x / Protein: 100 / Nitrite: Positive   Leuk Esterase: Moderate / RBC: 25-50 /HPF / WBC 3-5 /HPF   Sq Epi: x / Non Sq Epi: Few / Bacteria: Moderate /HPF      Culture Results:   No growth to date. ( @ 01:50)  Culture Results:   No growth to date. ( @ 01:50)      REVIEW OF SYSTEMS:  CONSTITUTIONAL: No fever, weight loss, or fatigue  EYES: No eye pain, visual disturbances, or discharge  ENMT:  No difficulty hearing, tinnitus, vertigo; No sinus or throat pain  NECK: No pain or stiffness  BREASTS: No pain, masses, or nipple discharge  RESPIRATORY: No cough, wheezing, chills or hemoptysis; No shortness of breath  CARDIOVASCULAR: No chest pain, palpitations, dizziness, or leg swelling  GASTROINTESTINAL: No abdominal or epigastric pain. No nausea, vomiting, or hematemesis; No diarrhea or constipation. No melena or hematochezia.  GENITOURINARY: No dysuria, frequency, hematuria, or incontinence  NEUROLOGICAL: No headaches, memory loss, loss of strength, numbness, or tremors  SKIN: No itching, burning, rashes, or lesions   LYMPH NODES: No enlarged glands  ENDOCRINE: No heat or cold intolerance; No hair loss  MUSCULOSKELETAL: No joint pain or swelling; No muscle, back, or extremity pain  PSYCHIATRIC: No depression, anxiety, mood swings, or difficulty sleeping  HEME/LYMPH: No easy bruising, or bleeding gums  ALLERY AND IMMUNOLOGIC: No hives or eczema    RADIOLOGY & ADDITIONAL TESTS:    1. CXR:   2. USG liver:  3. CT abdomen:     Imaging Personally Reviewed:  [+ ] YES  [ ] NO    Consultant(s) Notes Reviewed:  [+ ] YES  [ ] NO    PHYSICAL EXAM:  GENERAL: NAD, well-groomed, well-developed  HEAD:  Atraumatic, Normocephalic  EYES: EOMI, PERRLA, conjunctiva and sclera clear  ENMT: No tonsillar erythema, exudates, or enlargement; Moist mucous membranes, Good dentition, No lesions  NECK: Supple, No JVD, Normal thyroid  NERVOUS SYSTEM:  MS: AAOx3, speech fluid, comprehension intact, CN: PERRL, EOMI, VFF, V1-V3 b/l Intact, face symmetric, tongue midline, palate symmetric, hearing intact to external rub/whisper bilateral, neck supple, Motor: 5/5 power, negative drift, normal tone  Sensory: sensation grossly intact, Coordination: FNF, HTS, HERNESTO intact, DTR: 2+, toes down, Gait: nl base, stride, lily. Able to heel/toe/tandem  CHEST/LUNG: Clear to percussion bilaterally; No rales, rhonchi, wheezing, or rubs  HEART: Regular rate and rhythm; No murmurs, rubs, or gallops  ABDOMEN: Soft, Nontender, Nondistended; Bowel sounds present  EXTREMITIES:  2+ Peripheral Pulses, No clubbing, cyanosis, or edema  LYMPH: No lymphadenopathy noted  SKIN: No rashes or lesions       Care Discussed with Consultants/Other Providers [ ] YES  [ ] NO 85 year old female from home lives with daughter PMH of Lupus anticoagulant DVT b/l 15 years ago on coumadin , PAF on amiodarone , htn , hld  copd on 3 liter n/c at home presented with sob and noted to have aspirated material in the CT chest done in ER. pt was admitted with aspiration pneumonia on vanc and zosyn. RRt called for respiratory distress. pt noted to be tachypneic and gasping and crackles noted on clinical exam. Pt received 40 IV lasix and ramirez placed which drained 1 litre of urine. Pt brought to ICU for further management. Daughter attempted to be contacted multiple times with no response, for now pt remains full code.       PMH/PSH: as above, Allergy: No Known Allergies, Social history: Former smoker,   Meds in hospital:  tiotropium 18 MICROgram(s) Capsule 1Capsule(s) Inhalation daily  amiodarone    Tablet 200milliGRAM(s) Oral daily  diltiazem   CD 300milliGRAM(s) Oral daily  latanoprost 0.005% Ophthalmic Solution 1Drop(s) Both EYES at bedtime  sodium chloride 0.9%. 1000milliLiter(s) IV Continuous <Continuous>  piperacillin/tazobactam IVPB. 3.375Gram(s) IV Intermittent every 8 hours  vancomycin  IVPB 750milliGRAM(s) IV Intermittent every 12 hours  ondansetron Injectable 4milliGRAM(s) IV Push every 6 hours PRN  sodium chloride 0.9% Bolus 2000milliLiter(s) IV Bolus once  midazolam Injectable 2milliGRAM(s) IV Push once  norepinephrine Infusion 0.5MICROgram(s)/kG/Min IV Continuous <Continuous>  dexmedetomidine Infusion 0.4MICROgram(s)/kG/Hr IV Continuous <Continuous>  fentaNYL    Injectable 50MICROGram(s) IV Push every 6 hours PRN  pantoprazole  Injectable 40milliGRAM(s) IV Push daily    REVIEW OF SYSTEMS: Unable to obtain    PHYSICAL EXAM:  GENERAL: cachetic, in acute respiratory distress  HEAD:  Atraumatic, Normocephalic  EYES: EOMI, PERRLA, conjunctiva and sclera clear  ENMT: No tonsillar erythema, exudates, or enlargement; Moist mucous membranes, Good dentition, No lesions  NECK: Supple, No JVD, Normal thyroid  NERVOUS SYSTEM:  Awake, not alert, not oriented, unable to do a detailed neurological exam.   CHEST/LUNG: Crackles B/L, R>L  HEART: Regular rate and rhythm; No murmurs, rubs, or gallops  ABDOMEN: Soft, Nontender, distended; Bowel sounds present, Left lateral wall soft tissue mass present.   EXTREMITIES:  feeble pulses in Bilateral U/E and L/E, extensive bruises and ecchymosis.     VITALS:  T(F): 97.7, Max: 97.7 ( @ 20:58)  HR: 84  BP: 109/66  RR: 32  SpO2: 89%    LABS:                        11.3   14.9  )-----------( 251      ( 2017 02:52 )             35.9         137  |  102  |  17  ----------------------------<  104<H>  4.1   |  26  |  0.58    Ca    8.2<L>      2017 02:52  Phos  3.5       Mg     2.1         TPro  5.8<L>  /  Alb  1.7<L>  /  TBili  0.7  /  DBili  x   /  AST  171<H>  /  ALT  182<H>  /  AlkPhos  95      PT/INR - ( 2017 02:52 )   PT: 33.6 sec;   INR: 3.01 ratio         PTT - ( 2017 02:52 )  PTT:48.5 sec  Lactate, Blood: 1.0 mmol/L ( @ 02:52)  Creatine Kinase, Serum: 36 U/L ( @ 02:52)  Vitamin D, 25-Hydroxy: 8.0 ng/mL ( @ 10:30)   @ 06:3284  Serum Pro-Brain Natriuretic Peptide: 1482 pg/mL ( @ 12:32)    CAPILLARY BLOOD GLUCOSE    ABG - ( 2017 02:42 )  pH: 7.20  /  pCO2: 77    /  pO2: 60    / HCO3: 29    / Base Excess: 0.2   /  SaO2: 88        Urinalysis Basic - ( 2017 13:25 )    Color: Yellow / Appearance: Clear / S.010 / pH: x  Gluc: x / Ketone: Negative  / Bili: Negative / Urobili: 4   Blood: x / Protein: 100 / Nitrite: Positive   Leuk Esterase: Moderate / RBC: 25-50 /HPF / WBC 3-5 /HPF   Sq Epi: x / Non Sq Epi: Few / Bacteria: Moderate /HPF      Culture Results:   No growth to date. ( @ 01:50)  Culture Results:   No growth to date. ( @ 01:50)      RADIOLOGY & ADDITIONAL TESTS:    1. CTA chest: No acute pulmonary embolus. Significant amount of debris within the mid to distal trachea and within the segmental airway branches to the right lower lobe concerning for mucous plugging or aspiration. No dense airspace consolidation, although scattered areas of tree-in-bud opacities are seen in the bilateral bases, which may be infectious in etiology. Partially imaged infrarenal abdominal aortic aneurysm measuring at least 4.6 cm in transverse diameter. Incompletely imaged subcutaneous lesion within the lateral aspect of the lower left chest wall.  2. USG liver: Cholelithiasis without sonographic evidence of acute cholecystitis. Starry diane appearance of the liver. This is nonspecific and can be seen in the setting of acute hepatitis, fasting, right heartfailure, and infiltrative disease of the liver.       Imaging Personally Reviewed:  [+ ] YES  [ ] NO    Consultant(s) Notes Reviewed:  [+ ] YES  [ ] NO      Care Discussed with Consultants/Other Providers [ +] YES  [ ] NO

## 2017-06-13 NOTE — PROGRESS NOTE ADULT - PROBLEM SELECTOR PLAN 3
dvt : lupus anticoagulant positive , INR 2.3  c/w coumadin at home dose alternates 1 mg and 2 mg : ekg nsr   c/w amiodarone and coumadin h/o dvt : lupus anticoagulant positive , INR 2.3  held coumadin (at home dose alternates 1 mg and 2 mg) as INR 3.01  monitor inr, f/u venous dopplers b/l LE

## 2017-06-13 NOTE — CONSULT NOTE ADULT - PROBLEM SELECTOR RECOMMENDATION 4
Met with patient's sons, Mango and Nicolás, two daughters: Cecy and  Anayeli (HCP), along with patient's sister-in-law, Sammi. Discussed patient's current status and poor prognosis. Son/Nicolás, acknowledges patient's decline x 6+ months, with reported multiple hospitalizations. Discussed patient's code status - not a good candidate for CPR due to debil Met with patient's sons, Mango and Nicolás, two daughters: Cecy and  Anayeli (HCP), along with patient's sister-in-law, Sammi. Discussed patient's current status and poor prognosis. Family acknowledges patient's decline x 6+ months and repeated hospitalizations. Discussed patient's code status - patient is not a good candidate for CPR due to debilitated state; however, family awaiting for other family members before making a decision. Son/Nicolás states his mother "never would have wanted this." Patient remains a full code. Met with patient's sons: Mango and Nicolás; two daughters: Cecy and  Anayeli (HCP), along with patient's sister-in-law, Sammi. Discussed patient's current status and poor prognosis. Family acknowledges patient's decline over the past several months. Discussed patient's code status - family acknowledges patient is not a good candidate for CPR due to debilitated state, however, they are waiting for additional family member to arrive before making a decision. Son/Nicolás states his mother "never would have wanted this." Patient remains a full code.

## 2017-06-13 NOTE — PROCEDURE NOTE - NSTRACHPOSTINTU_RESP_A_CORE
Breath sounds bilateral/Chest excursion noted/Chest X-Ray/Breath sounds equal/Appropriate capnography/Positive end tidal Co2 noted

## 2017-06-13 NOTE — PROGRESS NOTE ADULT - PROBLEM SELECTOR PLAN 1
PNA: WBC COUNT OF 17   qsofa score 0 does not meet sepsis criteria  nonsignificant amount of debris within the mid to distal trachea and within   the segmental airway branches to the right lower lobe concerning for   mucous plugging or aspiration. No dense airspace consolidation, although scattered areas of tree-in-bud opacities are seen in the bilateral bases, which may be infectious in etiology.Patient got 1 dose of zosyn , azithromycin and vancomcyin in the ER.  aspiration precautions  c/w iv abx f/u cultures nebs round the clock , oxygen supplementation  id evaluation  case d/w Dr Samuels c/w iv zosyn and vancocmyin Dr Britt ID elevated liver enzymes: elevated liver enzymes of bili 1.3 ,  . ALT of 227 .   uncertain etiology , patient is not complaining of abdominal pain  elevated pro bnp 1149   f/u us gall bladder f/u hepatitis profile hold statuin f/u echo to r/o congestion likely 2/2 COPD exacerbation and aspiration pneumonia.   - AB.19/65/445 on 100% fio2, dropped fio2 to 35%. -c/w Vent setting  f/u repeat ABG  - precedex for sedation. levophed. f/u echo  - ABG repeat and adjust fio2. IV steroids. C/w antibiotics. likely 2/2 COPD exacerbation and aspiration pneumonia.   - AB.19/65/445 on 100% fio2, dropped fio2 to 35%. -c/w Vent setting  will get repeat ABG  - precedex for sedation. levophed.   f/u echo  -IV steroids. C/w antibiotics Vanco and Zosyn for pneumonia  ct chest showed mucous plugging or aspiration in trachea and RLL.

## 2017-06-14 DIAGNOSIS — N17.9 ACUTE KIDNEY FAILURE, UNSPECIFIED: ICD-10-CM

## 2017-06-14 LAB
ALBUMIN SERPL ELPH-MCNC: 1.6 G/DL — LOW (ref 3.5–5)
ALP SERPL-CCNC: 94 U/L — SIGNIFICANT CHANGE UP (ref 40–120)
ALT FLD-CCNC: 242 U/L DA — HIGH (ref 10–60)
ANION GAP SERPL CALC-SCNC: 11 MMOL/L — SIGNIFICANT CHANGE UP (ref 5–17)
AST SERPL-CCNC: 324 U/L — HIGH (ref 10–40)
BASE EXCESS BLDV CALC-SCNC: -3.4 MMOL/L — LOW (ref -2–2)
BASOPHILS # BLD AUTO: 0.1 K/UL — SIGNIFICANT CHANGE UP (ref 0–0.2)
BASOPHILS NFR BLD AUTO: 0.3 % — SIGNIFICANT CHANGE UP (ref 0–2)
BILIRUB SERPL-MCNC: 0.6 MG/DL — SIGNIFICANT CHANGE UP (ref 0.2–1.2)
BLOOD GAS COMMENTS, VENOUS: SIGNIFICANT CHANGE UP
BUN SERPL-MCNC: 28 MG/DL — HIGH (ref 7–18)
CALCIUM SERPL-MCNC: 7.3 MG/DL — LOW (ref 8.4–10.5)
CHLORIDE SERPL-SCNC: 105 MMOL/L — SIGNIFICANT CHANGE UP (ref 96–108)
CK MB BLD-MCNC: <2.8 % — SIGNIFICANT CHANGE UP (ref 0–3.5)
CK MB BLD-MCNC: <3.8 % — HIGH (ref 0–3.5)
CK MB CFR SERPL CALC: <1 NG/ML — SIGNIFICANT CHANGE UP (ref 0–3.6)
CK MB CFR SERPL CALC: <1 NG/ML — SIGNIFICANT CHANGE UP (ref 0–3.6)
CK SERPL-CCNC: 26 U/L — SIGNIFICANT CHANGE UP (ref 21–215)
CK SERPL-CCNC: 36 U/L — SIGNIFICANT CHANGE UP (ref 21–215)
CO2 SERPL-SCNC: 22 MMOL/L — SIGNIFICANT CHANGE UP (ref 22–31)
CREAT SERPL-MCNC: 1.65 MG/DL — HIGH (ref 0.5–1.3)
CULTURE RESULTS: NO GROWTH — SIGNIFICANT CHANGE UP
EOSINOPHIL # BLD AUTO: 0 K/UL — SIGNIFICANT CHANGE UP (ref 0–0.5)
EOSINOPHIL NFR BLD AUTO: 0 % — SIGNIFICANT CHANGE UP (ref 0–6)
GLUCOSE SERPL-MCNC: 171 MG/DL — HIGH (ref 70–99)
HCO3 BLDV-SCNC: 22 MMOL/L — SIGNIFICANT CHANGE UP (ref 21–29)
HCT VFR BLD CALC: 32.1 % — LOW (ref 34.5–45)
HGB BLD-MCNC: 10.1 G/DL — LOW (ref 11.5–15.5)
HOROWITZ INDEX BLDV+IHG-RTO: 35 — SIGNIFICANT CHANGE UP
INR BLD: 5.42 RATIO — CRITICAL HIGH (ref 0.88–1.16)
LYMPHOCYTES # BLD AUTO: 0.4 K/UL — LOW (ref 1–3.3)
LYMPHOCYTES # BLD AUTO: 2.2 % — LOW (ref 13–44)
MAGNESIUM SERPL-MCNC: 1.9 MG/DL — SIGNIFICANT CHANGE UP (ref 1.6–2.6)
MCHC RBC-ENTMCNC: 31.5 GM/DL — LOW (ref 32–36)
MCHC RBC-ENTMCNC: 31.9 PG — SIGNIFICANT CHANGE UP (ref 27–34)
MCV RBC AUTO: 101.2 FL — HIGH (ref 80–100)
MONOCYTES # BLD AUTO: 1.2 K/UL — HIGH (ref 0–0.9)
MONOCYTES NFR BLD AUTO: 6.2 % — SIGNIFICANT CHANGE UP (ref 2–14)
NEUTROPHILS # BLD AUTO: 17.8 K/UL — HIGH (ref 1.8–7.4)
NEUTROPHILS NFR BLD AUTO: 91.3 % — HIGH (ref 43–77)
PCO2 BLDV: 44 MMHG — SIGNIFICANT CHANGE UP (ref 35–50)
PH BLDV: 7.32 — LOW (ref 7.35–7.45)
PHOSPHATE SERPL-MCNC: 2.7 MG/DL — SIGNIFICANT CHANGE UP (ref 2.5–4.5)
PLATELET # BLD AUTO: 283 K/UL — SIGNIFICANT CHANGE UP (ref 150–400)
PO2 BLDV: 40 MMHG — SIGNIFICANT CHANGE UP (ref 25–45)
POTASSIUM SERPL-MCNC: 3.8 MMOL/L — SIGNIFICANT CHANGE UP (ref 3.5–5.3)
POTASSIUM SERPL-SCNC: 3.8 MMOL/L — SIGNIFICANT CHANGE UP (ref 3.5–5.3)
PROT SERPL-MCNC: 5.5 G/DL — LOW (ref 6–8.3)
PROTHROM AB SERPL-ACNC: 61.1 SEC — HIGH (ref 9.8–12.7)
RBC # BLD: 3.17 M/UL — LOW (ref 3.8–5.2)
RBC # FLD: 14.6 % — HIGH (ref 10.3–14.5)
SAO2 % BLDV: 73 % — SIGNIFICANT CHANGE UP (ref 67–88)
SODIUM SERPL-SCNC: 138 MMOL/L — SIGNIFICANT CHANGE UP (ref 135–145)
SPECIMEN SOURCE: SIGNIFICANT CHANGE UP
TROPONIN I SERPL-MCNC: 0.1 NG/ML — HIGH (ref 0–0.04)
TROPONIN I SERPL-MCNC: 0.15 NG/ML — HIGH (ref 0–0.04)
WBC # BLD: 19.5 K/UL — HIGH (ref 3.8–10.5)
WBC # FLD AUTO: 19.5 K/UL — HIGH (ref 3.8–10.5)

## 2017-06-14 PROCEDURE — 99497 ADVNCD CARE PLAN 30 MIN: CPT

## 2017-06-14 PROCEDURE — 71010: CPT | Mod: 26

## 2017-06-14 PROCEDURE — 99233 SBSQ HOSP IP/OBS HIGH 50: CPT

## 2017-06-14 RX ORDER — SODIUM CHLORIDE 9 MG/ML
1000 INJECTION, SOLUTION INTRAVENOUS
Qty: 0 | Refills: 0 | Status: DISCONTINUED | OUTPATIENT
Start: 2017-06-14 | End: 2017-06-14

## 2017-06-14 RX ORDER — PANTOPRAZOLE SODIUM 20 MG/1
40 TABLET, DELAYED RELEASE ORAL DAILY
Qty: 0 | Refills: 0 | Status: DISCONTINUED | OUTPATIENT
Start: 2017-06-14 | End: 2017-06-15

## 2017-06-14 RX ORDER — SODIUM CHLORIDE 9 MG/ML
500 INJECTION INTRAMUSCULAR; INTRAVENOUS; SUBCUTANEOUS ONCE
Qty: 0 | Refills: 0 | Status: COMPLETED | OUTPATIENT
Start: 2017-06-14 | End: 2017-06-14

## 2017-06-14 RX ORDER — ASPIRIN/CALCIUM CARB/MAGNESIUM 324 MG
81 TABLET ORAL DAILY
Qty: 0 | Refills: 0 | Status: DISCONTINUED | OUTPATIENT
Start: 2017-06-14 | End: 2017-06-14

## 2017-06-14 RX ADMIN — AMIODARONE HYDROCHLORIDE 200 MILLIGRAM(S): 400 TABLET ORAL at 06:05

## 2017-06-14 RX ADMIN — PHENYLEPHRINE HYDROCHLORIDE 4.13 MICROGRAM(S)/KG/MIN: 10 INJECTION INTRAVENOUS at 08:36

## 2017-06-14 RX ADMIN — LATANOPROST 1 DROP(S): 0.05 SOLUTION/ DROPS OPHTHALMIC; TOPICAL at 21:42

## 2017-06-14 RX ADMIN — DEXMEDETOMIDINE HYDROCHLORIDE IN 0.9% SODIUM CHLORIDE 4.31 MICROGRAM(S)/KG/HR: 4 INJECTION INTRAVENOUS at 18:01

## 2017-06-14 RX ADMIN — DEXMEDETOMIDINE HYDROCHLORIDE IN 0.9% SODIUM CHLORIDE 4.31 MICROGRAM(S)/KG/HR: 4 INJECTION INTRAVENOUS at 21:51

## 2017-06-14 RX ADMIN — Medication 81 MILLIGRAM(S): at 11:20

## 2017-06-14 RX ADMIN — SODIUM CHLORIDE 100 MILLILITER(S): 9 INJECTION, SOLUTION INTRAVENOUS at 00:58

## 2017-06-14 RX ADMIN — Medication 40 MILLIGRAM(S): at 18:00

## 2017-06-14 RX ADMIN — Medication 40 MILLIGRAM(S): at 06:05

## 2017-06-14 RX ADMIN — DEXMEDETOMIDINE HYDROCHLORIDE IN 0.9% SODIUM CHLORIDE 4.31 MICROGRAM(S)/KG/HR: 4 INJECTION INTRAVENOUS at 08:36

## 2017-06-14 RX ADMIN — FENTANYL CITRATE 50 MICROGRAM(S): 50 INJECTION INTRAVENOUS at 12:54

## 2017-06-14 RX ADMIN — PANTOPRAZOLE SODIUM 40 MILLIGRAM(S): 20 TABLET, DELAYED RELEASE ORAL at 11:20

## 2017-06-14 RX ADMIN — Medication 40 MILLIGRAM(S): at 00:31

## 2017-06-14 RX ADMIN — PHENYLEPHRINE HYDROCHLORIDE 4.13 MICROGRAM(S)/KG/MIN: 10 INJECTION INTRAVENOUS at 18:01

## 2017-06-14 RX ADMIN — ATORVASTATIN CALCIUM 40 MILLIGRAM(S): 80 TABLET, FILM COATED ORAL at 11:20

## 2017-06-14 RX ADMIN — PIPERACILLIN AND TAZOBACTAM 25 GRAM(S): 4; .5 INJECTION, POWDER, LYOPHILIZED, FOR SOLUTION INTRAVENOUS at 21:41

## 2017-06-14 RX ADMIN — FENTANYL CITRATE 50 MICROGRAM(S): 50 INJECTION INTRAVENOUS at 06:05

## 2017-06-14 RX ADMIN — SODIUM CHLORIDE 1500 MILLILITER(S): 9 INJECTION INTRAMUSCULAR; INTRAVENOUS; SUBCUTANEOUS at 03:52

## 2017-06-14 RX ADMIN — Medication 150 MILLIGRAM(S): at 05:02

## 2017-06-14 RX ADMIN — FENTANYL CITRATE 50 MICROGRAM(S): 50 INJECTION INTRAVENOUS at 23:07

## 2017-06-14 RX ADMIN — PIPERACILLIN AND TAZOBACTAM 25 GRAM(S): 4; .5 INJECTION, POWDER, LYOPHILIZED, FOR SOLUTION INTRAVENOUS at 18:01

## 2017-06-14 RX ADMIN — SODIUM CHLORIDE 70 MILLILITER(S): 9 INJECTION, SOLUTION INTRAVENOUS at 03:00

## 2017-06-14 RX ADMIN — FENTANYL CITRATE 50 MICROGRAM(S): 50 INJECTION INTRAVENOUS at 08:36

## 2017-06-14 RX ADMIN — FENTANYL CITRATE 50 MICROGRAM(S): 50 INJECTION INTRAVENOUS at 13:36

## 2017-06-14 RX ADMIN — PIPERACILLIN AND TAZOBACTAM 25 GRAM(S): 4; .5 INJECTION, POWDER, LYOPHILIZED, FOR SOLUTION INTRAVENOUS at 05:02

## 2017-06-14 NOTE — PROGRESS NOTE ADULT - PROBLEM SELECTOR PLAN 4
Met with daughters: Anayeli (HCP), Kamryn and Cecy, with sons: Nicolás and Mango. Discussed current status. Family states "she never wanted any of this." DNR/DNI with no escalation of care on file. Family requesting inpatient hospice with terminal extubation. Met with daughters: Anayeli (HCP), Kamryn and Cecy, with sons: Nicolás and Mango. Discussed current status. Family states "she never wanted any of this." Agreeable with DNR/DNI, no escalation of care. Family requesting inpatient hospice with terminal extubation likely Thursday or Friday; they would like the extubation to take place in hospice room.  Social work notified of inpatient hospice referral.

## 2017-06-14 NOTE — PROGRESS NOTE ADULT - PROBLEM SELECTOR PLAN 1
Bronchodilators  Ventilator support  DVT and Stress Ulcer PPX  NGT nutrition  Avoid Auto PEEP  Pulmonary toilet Supportive care  Cont meds  Consider hospice care

## 2017-06-14 NOTE — CHART NOTE - NSCHARTNOTEFT_GEN_A_CORE
Paged for resp distress and hypoxia, patient seen and examined at bedside  Admitted for aspi PNA on Vanc and Zosyn, has a H/O COPD on 3 L O2 at home  appears in moderate-severe resp distress, increased work of breathing  crackly b/l on exam, placed on 50% ventimask  Getting STAT CXR and ABG  Attempted to reach family, at [phone] three times, daughter Casey Sam to confirm code status, without response, and left a voicemail to call back unit  Also tried calling alternate num: [phone], with no reponse  Remains full code
Spoke with [facility] Proxy and also with Son of helio Peña at bedside, explained the plan and poor prognosis given her elderly age, multiple medical problems and requiring pressors and vent support, discussed about goals of care and family opted for Full code for now.
awaiting bedside bladder sono    will follow
patient had a wide complex tachycardia on the telemonitor and repeated the EKG  for the same and patient EKG  shows new onset LBBB and stat troponin 0.091 and will trend cardiac enzymes and given aspirin and statin   patient INR - SUPRA THERAPEUTIC -3.57 and will continue to monitor

## 2017-06-14 NOTE — PROGRESS NOTE ADULT - PROBLEM SELECTOR PLAN 2
Panculture  Antibiotics  ID consult  Oxygen supp  follow up CXR Bronchodilators  Ventilator support  DVT and Stress Ulcer PPX  NGT nutrition  Avoid Auto PEEP  Pulmonary toilet

## 2017-06-14 NOTE — PROGRESS NOTE ADULT - ATTENDING COMMENTS
[facility] NEPHROLOGY  Henrique Beach M.D.  Yoni Lloyd D.O.  Penelope Frazier M.D.  Argelia Henderson, MSN, ANP-C  [phone]    [address] [place] [address]
85 female with antiphospholipid syndrome on warfarin, COPD/emphysema, admitted with acute resp failure, pnuemonia, septic shock.  Now also with worsening renal failure and skin failure.  Continue ABX, mechanical ventilation, pressors.  Appreciate palliative input. Total CC time 35 minutes.
[facility] NEPHROLOGY  Henrique Beach M.D.  Yoni Lloyd D.O.  Penelope Frazier M.D.  Argelia Henderson, MSN, ANP-C  [phone]    [address] [place] [address]
85 female with antiphospholipid syndrome on warfarin, COPD/emphysema, admitted with acute resp failure, pnuemonia, septic shock.  Continue ABX, mechanical ventilation, pressors. Total CC time 35 minutes.

## 2017-06-14 NOTE — PROGRESS NOTE ADULT - ASSESSMENT
85y Female with h/o DVT, Lupus anticoagulant on A/C, Parox. AFib, HTN, COPD on home O2, a/w PNA. Hosp cb hypoxic respiratory failure s/p intubation now with septic shock, hypotensive on pressors with REGGIE.

## 2017-06-14 NOTE — PROGRESS NOTE ADULT - PROBLEM SELECTOR PLAN 1
likely 2/2 COPD exacerbation and aspiration pneumonia.   c/w Vent setting  - precedex for sedation. changed levophed to pheny as pt had wide complex vtach with HR in 120s last night and new LBBB on EKG. t1 0.091->0.0149 trending down.  echo showed moderate AS, normal EF  -IV steroids. C/w antibiotics Vanco and Zosyn for pneumonia  ct chest showed mucous plugging or aspiration in trachea and RLL.

## 2017-06-14 NOTE — PROGRESS NOTE ADULT - PROBLEM SELECTOR PLAN 3
h/o dvt : lupus anticoagulant positive , INR 5.4  held coumadin (at home dose alternates 1 mg and 2 mg) as INR supratherapeutic  monitor inr, f/u venous dopplers b/l LE

## 2017-06-14 NOTE — PROGRESS NOTE ADULT - PROBLEM SELECTOR PLAN 6
Cont meds  Monitor INR.
Pt on Zosyn. Urine culture-NG (already on abx). Management as per primary team.

## 2017-06-14 NOTE — PROGRESS NOTE ADULT - PROVIDER SPECIALTY LIST ADULT
Critical Care
Critical Care
Infectious Disease
Infectious Disease
Internal Medicine
Nephrology
Palliative Care
Pulmonology
Pulmonology
Urology
Nephrology

## 2017-06-14 NOTE — PROGRESS NOTE ADULT - PROBLEM SELECTOR PLAN 2
Secondary to aspiration pneumonia. Patient sedated and intubated. On levophed. Continue IV antibiotics. Secondary to aspiration pneumonia. Patient sedated and intubated. Patient developed V tach last evening - levophed discontinued and phenylephrine added. Continue IV antibiotics. DNR/DNI, no escalation of care. Family requests inpatient hospice and terminal extubation. Secondary to aspiration pneumonia. Patient sedated and intubated; noted with worsening renal failure. Patient developed V tach last evening - levophed discontinued and phenylephrine added. Continue IV antibiotics. DNR/DNI, no escalation of care. Family requests inpatient hospice and terminal extubation.

## 2017-06-14 NOTE — PROGRESS NOTE ADULT - PROBLEM SELECTOR PLAN 4
Proteinuria likely in the setting of infection (PNA/ UTI)/ microscopic hematuria:   Check urine pr/Cr to quantify proteinuria. Check SIFE.  Pt with neg HepBsAg and neg HepC.   Repeat UA when infection cleared.

## 2017-06-14 NOTE — PROGRESS NOTE ADULT - SUBJECTIVE AND OBJECTIVE BOX
Meds:  piperacillin/tazobactam IVPB. 3.375Gram(s) IV Intermittent every 8 hours  vancomycin  IVPB 750milliGRAM(s) IV Intermittent every 12 hours    Allergies:  Allergies    No Known Allergies    Intolerances        ROS  [ x ] UNABLE TO ELICIT    General:  [  ] None  [  ] Fever  [  ] Chills  [  ] Malaise    Skin:  [  ] None [  ] Rash  [  ] Wound  [  ] Ulcer    HEENT:  [  ] None  [  ] Sore Throat  [  ] Nasal congestion/ runny nose  [  ] Photophobia [  ] Neck pain      Chest:  [  ] None   [  ] SOB  [  ] Cough  [  ] None    Cardiovascular:   [  ] None  [  ] CP  [  ] Palpitation    Gastrointestinal:  [  ] None  [  ] Abd pain   [  ] Nausea    [  ] Vomiting   [  ] Diarrhea	     Genitourinary:  [  ] None [  ] Polyuria   [  ] Urgency  [  ] Frequency  [  ] Dysuria    [  ]  Hematuria       Musculoskeletal:  [  ] None [  ] Back Pain	[  ] Body aches  [  ] Joint pain    Neurological: [  ] None [  ]Dizziness  [  ]Visual Disturbance  [  ]Headaches   [  ] Weakness          PHYSICAL EXAM:    Vital Signs Last 24 Hrs  T(C): 36.2, Max: 38.7 (06-13 @ 12:30)  T(F): 97.2, Max: 101.7 (06-13 @ 12:30)  HR: 79 (74 - 126)  BP: 109/54 (64/33 - 144/55)  BP(mean): 69 (40 - 78)  RR: 18 (16 - 34)  SpO2: 100% (97% - 100%)    HEENT: [  ] Wnl  [  ] Pharyngeal congestion [x] Intubated.    Neck:  [ x ] Supple  [  ]Lymphadenopathy  [ x ] No JVD   [  ] JVD  [  ] Masses   [  ] WNL    CHEST/Respiratory:  [  ]Clear to auscultation  [ x ] Rales   [  ] Rhonchi   [  ] Wheezing     [  ] Chest Tenderness      Cardiovascular:  [ x ] Reg S1 S2   [  ] Irreg S1 S2   [  ]No Murmur  [x  ] +ve Murmurs  [x  ]Systolic [  ]Diastolic      Abdomen:  [x  ] Soft  [x  ] No tendrerness  [  ] Tenderness  [  ] Organomegaly  [  ] ABD Distention  [  ] Rigidity                       [ x ] No Regidity                       [ x ] No Rebound Tenderness  [ x ] No Guarding Rigidity  [  ] Rebound Tenderness[  ] Guarding Rigidity                          [x  ]  +ve Bowel Sounds  [  ] Decreased Bowel Sounds    [  ] Absent Bowel Sounds                            Extremities: [  ] No edema [x  ] Edema upper extremities   [  ] Clubbing   [  ] Cyanosis                         [  ] No Tender Calf muscles  [  ] Tender Calf muscles                        [  ] Palpable peripheral pulses  Neurological: [  ] Awake  [  ] Alert  [  ] Oriented  x                             [  ] Confused  [  ] Drowzy  [ x ] repond to painful stimuli  [  ] Unresponsive    Skin:  [  ] Intact [  ] Redness [  ] Thrombophlebitis  [ x ]  Ecchymotic Rashes upper ext.   [  ] Dry  [  ] Ulcers    Ortho:  [  ] Joint Swelling  [  ] Joint erythema [  ] Joint tenderness                [  ] Increased temp. to touch  [ x ] DJD             LABS/DIAGNOSTIC TESTS                                            10.1   19.5  )-----------( 283      ( 14 Jun 2017 03:13 )             32.1   06-14    138  |  105  |  28<H>  ----------------------------<  171<H>  3.8   |  22  |  1.65<H>    Ca    7.3<L>      14 Jun 2017 03:13  Phos  2.7     06-14  Mg     1.9     06-14    TPro  5.5<L>  /  Alb  1.6<L>  /  TBili  0.6  /  DBili  x   /  AST  324<H>  /  ALT  242<H>  /  AlkPhos  94  06-14    PT/INR - ( 14 Jun 2017 03:13 )   PT: 61.1 sec;   INR: 5.42 ratio         PTT - ( 13 Jun 2017 13:58 )  PTT:48.0 sec      CULTURES:     Culture - Sputum . (06.13.17 @ 12:36)    Gram Stain:   No squamous epithelial cells per low power field  Moderate White blood cells per low power field  No organisms seen per oil power field    Specimen Source: .Sputum Sputum - TRAP    Culture Results:   No growth to date.    Culture - Urine (06.13.17 @ 09:53)    Specimen Source: .Urine Catheterized    Culture Results:   No growth    Culture - Blood (06.13.17 @ 09:49)    Specimen Source: .Blood Blood    Culture Results:   No growth to date.      RADIOLOGY  CXR:    EXAM:  CHEST PORTABLE ROUTINE                            PROCEDURE DATE:  06/14/2017        INTERPRETATION:  INDICATION: Respiratory failure    PRIORS: June 13, 2017 at 1:25 PM    VIEWS: Portable AP radiography of the chest performed. Evaluation limited   secondary to patient rotation and shallow inspiration.    FINDINGS: Heart size appears within normal limits. Tortuosity of the   thoracic aorta appears unchanged. There is no significant interval change   in position of the indwelling endotracheal tube or NGT. Note is made of   an IVC filter. Bilateral pleural effusions appear unchanged; underlying   parenchymal infiltrate or consolidation in the region of effusion cannot   be excluded. There is no evidence for pneumothorax. No significant  mediastinal shift is noted.    IMPRESSION: No significant interval change, with continued bilateral   pleural effusions; underlying parenchymal infiltrate or consolidation in   the region of effusion cannot be excluded. No significant interval change   in position of the indwelling ETT.    Assessment and Recommendation:   · Assessment		  Patint with history of COPD, P AFIB, HTN, HLD, and DVT presented with symptoms and signs for Pneumonia,  Work up suggested for Pneumonia, mucous plugging and chololithiasis.  Patient was started in IV Vancomycin and Zosyn.  Patient is intubated after went in respiratory distress and patient is transferred to ICU.  6/14/17 Creatinin is increased and patient is still intubated.              Patient is febrile, WBC is still high but mildly better, and CXR shows no significant change .    Problem/Recommendation - 1:  Problem: Pneumonia.   Recommendation:   1- Continue IV Vancomycin.  2- Continue IV Zosyn.   3- Respirator management as per pulmonary.  4- ICU monitoring and management.  5- F/u CXR.   6- COPD management  7- Follow vancomycin trough.    Problem/Recommendation - 2:  ·  Problem: COPD (chronic obstructive pulmonary disease).    Recommendation:   1- Bronchodilators.  2- O2 as needed.  3- Pulmonary management.  4- Steroids as per primary, and pulmonary team if needed.     Problem/Recommendation - 3:  ·  Problem: Afib.    Recommendation:   1- monitor heart rate closely.  2- Blood pressure control.  3- Cardiac meds as per cardiology and primary care team.     Problem/Recommendation - 4:  ·  Problem: HTN (hypertension).    Recommendation:   1- monitor Blood pressure closely.  2-Blood pressure control.  3- BP. meds as per cardiology and primary care team.     Problem/Recommendation - 5:  ·  Problem: HLD (hyperlipidemia).    Recommendation:   1- low fat low cholesterol diet.  2- Lipid profile.  3- Cholesterol medication as per primary care team.     Problem/Recommendation - 6:  Problem: Anemia.   Recommendation:   1- Anemia profile.  2- iron supplements.  3- Closely monitor H & H.  4- Observe for bleeding.    Discussed with ICU resident.

## 2017-06-14 NOTE — PROGRESS NOTE ADULT - PROBLEM SELECTOR PLAN 1
oliguric REGGIE likely hemodynamically mediated in the setting of septic shock/ hypotension.   Check urine lytes. Check Renal US.   Strict I/Os. Avoid nephrotoxins/ NSAIDs/ RCA. Avoid hypotension. Monitor BMP.

## 2017-06-14 NOTE — PROGRESS NOTE ADULT - SUBJECTIVE AND OBJECTIVE BOX
Patient is a 85y old  Female who presents with acute resp distress overnight likely 2/2 COPD exacerbation and aspiration pneumonia.   s/p emergency intubation and central line placment    pt seen in icu [ x ], reg med floor [   ], bed [x  ], chair at bedside [   ], a+o x3 [  ], sedated [ x ],  nad [ x ]    ramirez [x  ], ngt [ x ], peg [  ], et tube [ x ], cent line [ x ] on precedex and phenylephrine drip [x]        Allergies    No Known Allergies        Vitals    T(F): 97, Max: 101.2 (06-13 @ 15:18)  HR: 83 (74 - 126)  BP: 125/50 (64/33 - 144/55)  RR: 24 (16 - 34)  SpO2: 100% (97% - 100%)  Wt(kg): --  CAPILLARY BLOOD GLUCOSE  225 (14 Jun 2017 06:00)      Labs                          10.1   19.5  )-----------( 283      ( 14 Jun 2017 03:13 )             32.1       06-14    138  |  105  |  28<H>  ----------------------------<  171<H>  3.8   |  22  |  1.65<H>    Ca    7.3<L>      14 Jun 2017 03:13  Phos  2.7     06-14  Mg     1.9     06-14    TPro  5.5<L>  /  Alb  1.6<L>  /  TBili  0.6  /  DBili  x   /  AST  324<H>  /  ALT  242<H>  /  AlkPhos  94  06-14      CARDIAC MARKERS ( 14 Jun 2017 08:38 )  0.103 ng/mL / x     / 36 U/L / x     / <1.0 ng/mL  CARDIAC MARKERS ( 14 Jun 2017 03:13 )  0.149 ng/mL / x     / 26 U/L / x     / <1.0 ng/mL  CARDIAC MARKERS ( 13 Jun 2017 20:37 )  0.091 ng/mL / x     / 33 U/L / x     / <1.0 ng/mL  CARDIAC MARKERS ( 13 Jun 2017 13:58 )  0.029 ng/mL / x     / 25 U/L / x     / x      CARDIAC MARKERS ( 13 Jun 2017 02:52 )  <0.015 ng/mL / x     / 36 U/L / x     / 1.1 ng/mL        .Sputum Sputum - TRAP  06-13 @ 12:36   No growth to date.  --    No squamous epithelial cells per low power field  Moderate White blood cells per low power field  No organisms seen per oil power field      .Urine Catheterized  06-13 @ 09:53   No growth  --  --      .Blood Blood  06-13 @ 09:49   No growth to date.  --  --      .Blood Blood  06-12 @ 01:50   No growth to date.  --  --          Radiology Results      Meds    MEDICATIONS  (STANDING):  amiodarone    Tablet 200milliGRAM(s) Oral daily  latanoprost 0.005% Ophthalmic Solution 1Drop(s) Both EYES at bedtime  piperacillin/tazobactam IVPB. 3.375Gram(s) IV Intermittent every 8 hours  dexmedetomidine Infusion 0.4MICROgram(s)/kG/Hr IV Continuous <Continuous>  atorvastatin 40milliGRAM(s) Oral daily  phenylephrine    Infusion 0.5MICROgram(s)/kG/Min IV Continuous <Continuous>  aspirin  chewable 81milliGRAM(s) Oral daily  methylPREDNISolone sodium succinate Injectable 40milliGRAM(s) IV Push every 12 hours  pantoprazole   Suspension 40milliGRAM(s) Oral daily      MEDICATIONS  (PRN):  ondansetron Injectable 4milliGRAM(s) IV Push every 6 hours PRN Nausea and/or Vomiting  fentaNYL    Injectable 50MICROGram(s) IV Push every 6 hours PRN sedation  ALBUTerol    90 MICROgram(s) HFA Inhaler 2Puff(s) Inhalation every 6 hours PRN Bronchospasm  acetaminophen  Suppository 650milliGRAM(s) Rectal four times a day PRN For Temp greater than 38 C (100.4 F)      Physical Exam    Neuro :  no focal deficits  Respiratory:decrease breath sounds right lower lobe   CV: RRR, S1S2, no murmurs,   Abdominal: Soft, NT, ND +BS,  Extremities: No edema, + peripheral pulses,  left leg ecchimosis and dry skin scales    ASSESSMENT    Acute respiratory failure with hypoxia and hypercapnia likely 2/2 COPD exacerbation and aspiration pneumonia.   pneumonia possible 2nd to aspiration,   sepsis,   microscopic hematuria,   proteinuria,   transaminitis,   cholelithiasis,    h/o PAF on amiodarone ,   Pneumonitis due to inhalation of food or vomitus  DVT (deep venous thrombosis) on coumadin  Lupus anticoagulant disorder  htn ,   hld    copd on 3 liter n/c at home      PLAN    cont to hold coumadin 2nd to supratherapeutic inr  f/u echo  ABG repeat and adjust fio2. IV steroids.  pulm f/u  id f/u noted  cont vanco and zosyn  cont steroids  renal f/u noted  urology f/u noted  cardio cons  cont current meds  mgmt as per icu Patient is a 85y old  Female who presents with acute resp distress overnight likely 2/2 COPD exacerbation and aspiration pneumonia.   s/p emergency intubation and central line placment    pt seen in icu [ x ], reg med floor [   ], bed [x  ], chair at bedside [   ], awake and responsive [ x ], sedated [  ],  nad [ x ]    ramirez to bsd [x  ], ngt [ x ], peg [  ], et tube [ x ], cent line [ x ] on precedex, fentanyl and phenylephrine drip [x]      pt now dnr    Allergies    No Known Allergies        Vitals    T(F): 97, Max: 101.2 (06-13 @ 15:18)  HR: 83 (74 - 126)  BP: 125/50 (64/33 - 144/55)  RR: 24 (16 - 34)  SpO2: 100% (97% - 100%)  Wt(kg): --  CAPILLARY BLOOD GLUCOSE  225 (14 Jun 2017 06:00)      Labs                          10.1   19.5  )-----------( 283      ( 14 Jun 2017 03:13 )             32.1       06-14    138  |  105  |  28<H>  ----------------------------<  171<H>  3.8   |  22  |  1.65<H>    Ca    7.3<L>      14 Jun 2017 03:13  Phos  2.7     06-14  Mg     1.9     06-14    TPro  5.5<L>  /  Alb  1.6<L>  /  TBili  0.6  /  DBili  x   /  AST  324<H>  /  ALT  242<H>  /  AlkPhos  94  06-14      CARDIAC MARKERS ( 14 Jun 2017 08:38 )  0.103 ng/mL / x     / 36 U/L / x     / <1.0 ng/mL  CARDIAC MARKERS ( 14 Jun 2017 03:13 )  0.149 ng/mL / x     / 26 U/L / x     / <1.0 ng/mL  CARDIAC MARKERS ( 13 Jun 2017 20:37 )  0.091 ng/mL / x     / 33 U/L / x     / <1.0 ng/mL  CARDIAC MARKERS ( 13 Jun 2017 13:58 )  0.029 ng/mL / x     / 25 U/L / x     / x      CARDIAC MARKERS ( 13 Jun 2017 02:52 )  <0.015 ng/mL / x     / 36 U/L / x     / 1.1 ng/mL        .Sputum Sputum - TRAP  06-13 @ 12:36   No growth to date.  --    No squamous epithelial cells per low power field  Moderate White blood cells per low power field  No organisms seen per oil power field      .Urine Catheterized  06-13 @ 09:53   No growth  --  --      .Blood Blood  06-13 @ 09:49   No growth to date.  --  --      .Blood Blood  06-12 @ 01:50   No growth to date.  --  --          Radiology Results      Meds    MEDICATIONS  (STANDING):  amiodarone    Tablet 200milliGRAM(s) Oral daily  latanoprost 0.005% Ophthalmic Solution 1Drop(s) Both EYES at bedtime  piperacillin/tazobactam IVPB. 3.375Gram(s) IV Intermittent every 8 hours  dexmedetomidine Infusion 0.4MICROgram(s)/kG/Hr IV Continuous <Continuous>  atorvastatin 40milliGRAM(s) Oral daily  phenylephrine    Infusion 0.5MICROgram(s)/kG/Min IV Continuous <Continuous>  aspirin  chewable 81milliGRAM(s) Oral daily  methylPREDNISolone sodium succinate Injectable 40milliGRAM(s) IV Push every 12 hours  pantoprazole   Suspension 40milliGRAM(s) Oral daily      MEDICATIONS  (PRN):  ondansetron Injectable 4milliGRAM(s) IV Push every 6 hours PRN Nausea and/or Vomiting  fentaNYL    Injectable 50MICROGram(s) IV Push every 6 hours PRN sedation  ALBUTerol    90 MICROgram(s) HFA Inhaler 2Puff(s) Inhalation every 6 hours PRN Bronchospasm  acetaminophen  Suppository 650milliGRAM(s) Rectal four times a day PRN For Temp greater than 38 C (100.4 F)      Physical Exam    Neuro :  no focal deficits  Respiratory:decrease breath sounds right lower lobe   CV: RRR, S1S2, no murmurs,   Abdominal: Soft, NT, ND +BS,  Extremities: No edema, + peripheral pulses,  left leg ecchimosis and dry skin scales    ASSESSMENT    Acute respiratory failure with hypoxia and hypercapnia likely 2/2 COPD exacerbation and aspiration pneumonia.   pneumonia possible 2nd to aspiration,   sepsis,   microscopic hematuria,   proteinuria,   transaminitis,   cholelithiasis,    h/o PAF on amiodarone ,   Pneumonitis due to inhalation of food or vomitus  DVT (deep venous thrombosis) on coumadin  Lupus anticoagulant disorder  htn ,   hld    copd on 3 liter n/c at home      PLAN    cont to hold coumadin 2nd to supratherapeutic inr  f/u echo  ABG repeat and adjust fio2. IV steroids.  pulm f/u  id f/u noted  cont vanco and zosyn  cont steroids  renal f/u noted  urology f/u noted  cardio cons  cont current meds  mgmt as per icu

## 2017-06-14 NOTE — PROGRESS NOTE ADULT - SUBJECTIVE AND OBJECTIVE BOX
Patient is a 85y old  Female who presents with a chief complaint of malaise, cough, sob, runny nose x2 days. Cough productive of thick yellowish sputum, 1 tbsp each time. Subjective fever and chills. Sick contacts: 1 grand child with URI.  Former smoker, 1 pack/day/20 years. Quit 20 years ago. Pt has h/o COPD on home oxygen 3 lts.  Patient presented yesterday with respiratory distress and hypoxia, transferred to ICU. Patient was sedated, intubated for Hypercapnic Respiratory failure, Central Arterial line present.      INTERVAL HPI/OVERNIGHT EVENTS:      VITAL SIGNS:  T(F): 97.2  HR: 85  BP: 97/50  RR: 23  SpO2: 100%  Wt(kg): --  I&O's Detail    I & Os for current day (as of 14 Jun 2017 09:44)  =============================================  IN:    lactated ringers.: 2000 ml    norepinephrine Infusion: 857.7 ml    phenylephrine   Infusion: 380.2 ml    IV PiggyBack: 250 ml    lactated ringers.: 210 ml    dexmedetomidine Infusion: 102.4 ml    Glucerna: 70 ml    Total IN: 3870.3 ml  ---------------------------------------------  OUT:    Indwelling Catheter - Urethral: 215 ml    Total OUT: 215 ml  ---------------------------------------------  Total NET: 3655.3 ml    Mode: AC/ CMV (Assist Control/ Continuous Mandatory Ventilation)  RR (machine): 20  TV (machine): 350  FiO2: 35  PEEP: 5  ITime: 1  MAP: 10  PIP: 26        REVIEW OF SYSTEMS:    CONSTITUTIONAL:  No fevers, chills, sweats    HEENT:  Eyes:  No diplopia or blurred vision. ENT:  No earache, sore throat or runny nose.    CARDIOVASCULAR:  No pressure, squeezing, tightness, or heaviness about the chest; no palpitations.    RESPIRATORY:  Per HPI    GASTROINTESTINAL:  No abdominal pain, nausea, vomiting or diarrhea.    GENITOURINARY:  No dysuria, frequency or urgency.    NEUROLOGIC:  No paresthesias, fasciculations, seizures or weakness.    PSYCHIATRIC:  No disorder of thought or mood.      PHYSICAL EXAM:    Constitutional: Well developed and nourished  Eyes:Perrla  ENMT: normal  Neck:supple  Respiratory: good air entry  Cardiovascular: S1 S2 regular  Gastrointestinal: Soft, Non tender  Extremities: No edema  Vascular:normal  Neurological:Awake, alert,Ox3  Musculoskeletal:Normal      MEDICATIONS  (STANDING):  amiodarone    Tablet 200milliGRAM(s) Oral daily  latanoprost 0.005% Ophthalmic Solution 1Drop(s) Both EYES at bedtime  piperacillin/tazobactam IVPB. 3.375Gram(s) IV Intermittent every 8 hours  vancomycin  IVPB 750milliGRAM(s) IV Intermittent every 12 hours  dexmedetomidine Infusion 0.4MICROgram(s)/kG/Hr IV Continuous <Continuous>  pantoprazole  Injectable 40milliGRAM(s) IV Push daily  methylPREDNISolone sodium succinate Injectable 40milliGRAM(s) IV Push every 6 hours  atorvastatin 40milliGRAM(s) Oral daily  phenylephrine    Infusion 0.5MICROgram(s)/kG/Min IV Continuous <Continuous>  aspirin  chewable 81milliGRAM(s) Oral daily    MEDICATIONS  (PRN):  ondansetron Injectable 4milliGRAM(s) IV Push every 6 hours PRN Nausea and/or Vomiting  fentaNYL    Injectable 50MICROGram(s) IV Push every 6 hours PRN sedation  ALBUTerol    90 MICROgram(s) HFA Inhaler 2Puff(s) Inhalation every 6 hours PRN Bronchospasm  acetaminophen  Suppository 650milliGRAM(s) Rectal four times a day PRN For Temp greater than 38 C (100.4 F)      Allergies    No Known Allergies    Intolerances        LABS:                        10.1   19.5  )-----------( 283      ( 14 Jun 2017 03:13 )             32.1     06-14    138  |  105  |  28<H>  ----------------------------<  171<H>  3.8   |  22  |  1.65<H>    Ca    7.3<L>      14 Jun 2017 03:13  Phos  2.7     06-14  Mg     1.9     06-14    TPro  5.5<L>  /  Alb  1.6<L>  /  TBili  0.6  /  DBili  x   /  AST  324<H>  /  ALT  242<H>  /  AlkPhos  94  06-14    PT/INR - ( 14 Jun 2017 03:13 )   PT: 61.1 sec;   INR: 5.42 ratio         PTT - ( 13 Jun 2017 13:58 )  PTT:48.0 sec    ABG - ( 13 Jun 2017 13:43 )  pH: 7.35  /  pCO2: 33    /  pO2: 88    / HCO3: 17    / Base Excess: -7.0  /  SaO2: 97                CARDIAC MARKERS ( 14 Jun 2017 08:38 )  0.103 ng/mL / x     / 36 U/L / x     / <1.0 ng/mL  CARDIAC MARKERS ( 14 Jun 2017 03:13 )  0.149 ng/mL / x     / 26 U/L / x     / <1.0 ng/mL  CARDIAC MARKERS ( 13 Jun 2017 20:37 )  0.091 ng/mL / x     / 33 U/L / x     / <1.0 ng/mL  CARDIAC MARKERS ( 13 Jun 2017 13:58 )  0.029 ng/mL / x     / 25 U/L / x     / x      CARDIAC MARKERS ( 13 Jun 2017 02:52 )  <0.015 ng/mL / x     / 36 U/L / x     / 1.1 ng/mL      CAPILLARY BLOOD GLUCOSE  225 (14 Jun 2017 06:00)  231 (14 Jun 2017 00:00)    pro-bnp 1482 06-11 @ 12:32     d-dimer --  06-11 @ 12:32      RADIOLOGY & ADDITIONAL TESTS:    CXR:  IMPRESSION: No significant interval change, with continued bilateral   pleural effusions; underlying parenchymal infiltrate or consolidation in   the region of effusion cannot be excluded. No significant interval change   in position of the indwelling ETT.    SANIA YEN   This document has been electronically signed. Jun 14 2017  9:06AM    Ct scan chest:    ekg;    echo: Patient is a 85y old  Female who presents with a chief complaint of malaise, cough, sob, runny nose x2 days. Cough productive of thick yellowish sputum, 1 tbsp each time. Subjective fever and chills. Sick contacts: 1 grand child with URI.  Former smoker, 1 pack/day/20 years. Quit 20 years ago. Pt has h/o COPD on home oxygen 3 lts.  Patient presented yesterday with respiratory distress and hypoxia, transferred to ICU. Patient was sedated, intubated for Hypercapnic Respiratory failure, Central Arterial line present. awake not alert, intubated on mechanical ventilation in NAD      INTERVAL HPI/OVERNIGHT EVENTS:      VITAL SIGNS:  T(F): 97.2  HR: 85  BP: 97/50  RR: 23  SpO2: 100%  Wt(kg): --  I&O's Detail    I & Os for current day (as of 14 Jun 2017 09:44)  =============================================  IN:    lactated ringers.: 2000 ml    norepinephrine Infusion: 857.7 ml    phenylephrine   Infusion: 380.2 ml    IV PiggyBack: 250 ml    lactated ringers.: 210 ml    dexmedetomidine Infusion: 102.4 ml    Glucerna: 70 ml    Total IN: 3870.3 ml  ---------------------------------------------  OUT:    Indwelling Catheter - Urethral: 215 ml    Total OUT: 215 ml  ---------------------------------------------  Total NET: 3655.3 ml    Mode: AC/ CMV (Assist Control/ Continuous Mandatory Ventilation)  RR (machine): 20  TV (machine): 350  FiO2: 35  PEEP: 5  ITime: 1  MAP: 10  PIP: 26        REVIEW OF SYSTEMS:    CONSTITUTIONAL:  No fevers, chills, sweats    HEENT:  Eyes:  No diplopia or blurred vision. ENT:  No earache, sore throat or runny nose.    CARDIOVASCULAR:  No pressure, squeezing, tightness, or heaviness about the chest; no palpitations.    RESPIRATORY:  Per HPI    GASTROINTESTINAL:  No abdominal pain, nausea, vomiting or diarrhea.    GENITOURINARY:  No dysuria, frequency or urgency.    NEUROLOGIC:  No paresthesias, fasciculations, seizures or weakness.    PSYCHIATRIC:  No disorder of thought or mood.      PHYSICAL EXAM:    Constitutional: Well developed and nourished  Eyes:Perrla  ENMT: normal  Neck:supple  Respiratory: good air entry  Cardiovascular: S1 S2 regular  Gastrointestinal: Soft, Non tender  Extremities: edema of exts  Vascular:normal  Neurological:Awake, alert,Ox3  Musculoskeletal:Normal      MEDICATIONS  (STANDING):  amiodarone    Tablet 200milliGRAM(s) Oral daily  latanoprost 0.005% Ophthalmic Solution 1Drop(s) Both EYES at bedtime  piperacillin/tazobactam IVPB. 3.375Gram(s) IV Intermittent every 8 hours  vancomycin  IVPB 750milliGRAM(s) IV Intermittent every 12 hours  dexmedetomidine Infusion 0.4MICROgram(s)/kG/Hr IV Continuous <Continuous>  pantoprazole  Injectable 40milliGRAM(s) IV Push daily  methylPREDNISolone sodium succinate Injectable 40milliGRAM(s) IV Push every 6 hours  atorvastatin 40milliGRAM(s) Oral daily  phenylephrine    Infusion 0.5MICROgram(s)/kG/Min IV Continuous <Continuous>  aspirin  chewable 81milliGRAM(s) Oral daily    MEDICATIONS  (PRN):  ondansetron Injectable 4milliGRAM(s) IV Push every 6 hours PRN Nausea and/or Vomiting  fentaNYL    Injectable 50MICROGram(s) IV Push every 6 hours PRN sedation  ALBUTerol    90 MICROgram(s) HFA Inhaler 2Puff(s) Inhalation every 6 hours PRN Bronchospasm  acetaminophen  Suppository 650milliGRAM(s) Rectal four times a day PRN For Temp greater than 38 C (100.4 F)      Allergies    No Known Allergies    Intolerances        LABS:                        10.1   19.5  )-----------( 283      ( 14 Jun 2017 03:13 )             32.1     06-14    138  |  105  |  28<H>  ----------------------------<  171<H>  3.8   |  22  |  1.65<H>    Ca    7.3<L>      14 Jun 2017 03:13  Phos  2.7     06-14  Mg     1.9     06-14    TPro  5.5<L>  /  Alb  1.6<L>  /  TBili  0.6  /  DBili  x   /  AST  324<H>  /  ALT  242<H>  /  AlkPhos  94  06-14    PT/INR - ( 14 Jun 2017 03:13 )   PT: 61.1 sec;   INR: 5.42 ratio         PTT - ( 13 Jun 2017 13:58 )  PTT:48.0 sec    ABG - ( 13 Jun 2017 13:43 )  pH: 7.35  /  pCO2: 33    /  pO2: 88    / HCO3: 17    / Base Excess: -7.0  /  SaO2: 97                CARDIAC MARKERS ( 14 Jun 2017 08:38 )  0.103 ng/mL / x     / 36 U/L / x     / <1.0 ng/mL  CARDIAC MARKERS ( 14 Jun 2017 03:13 )  0.149 ng/mL / x     / 26 U/L / x     / <1.0 ng/mL  CARDIAC MARKERS ( 13 Jun 2017 20:37 )  0.091 ng/mL / x     / 33 U/L / x     / <1.0 ng/mL  CARDIAC MARKERS ( 13 Jun 2017 13:58 )  0.029 ng/mL / x     / 25 U/L / x     / x      CARDIAC MARKERS ( 13 Jun 2017 02:52 )  <0.015 ng/mL / x     / 36 U/L / x     / 1.1 ng/mL      CAPILLARY BLOOD GLUCOSE  225 (14 Jun 2017 06:00)  231 (14 Jun 2017 00:00)    pro-bnp 1482 06-11 @ 12:32     d-dimer --  06-11 @ 12:32      RADIOLOGY & ADDITIONAL TESTS:    CXR:  IMPRESSION: No significant interval change, with continued bilateral   pleural effusions; underlying parenchymal infiltrate or consolidation in   the region of effusion cannot be excluded. No significant interval change   in position of the indwelling ETT.    SANIA YEN   This document has been electronically signed. Jun 14 2017  9:06AM    Ct scan chest:    ekg;    echo:

## 2017-06-14 NOTE — PROGRESS NOTE ADULT - PROBLEM SELECTOR PLAN 1
Secondary to aspiration pneumonia/COPD exacerbation. Patient dependent on O2 per NC prior to hospitalization; patient remains sedated and intubated. Patient appropriate for hospice services - family request IPU and terminal extubation, stating "she never wanted any of this." DNR/DNI on file. Secondary to aspiration pneumonia/COPD exacerbation. Patient dependent on O2 per NC prior to hospitalization; patient remains sedated and intubated. Poor prognosis. Patient appropriate for hospice services - family request IPU and terminal extubation, stating "she never wanted any of this." DNR/DNI, with no escalation of care.

## 2017-06-14 NOTE — PROGRESS NOTE ADULT - ASSESSMENT
85 year old female from home lives with daughter PMH of Lupus anticoagulant DVT b/l 15 years ago on coumadin , PAF on amiodarone , htn , hld  copd on 3 liter n/c at home presents to the ER with complaints of feeling malaise , SOB and productive cough admitted for pneumonia s/p RRT for Resp distress admitted to ICU for Hypoxic Hypercapneic Resp Failure 2/2 Aspiration Pneumonia and COPD exacerbation.

## 2017-06-14 NOTE — PROGRESS NOTE ADULT - SUBJECTIVE AND OBJECTIVE BOX
OVERNIGHT EVENTS: ongoing acute on chronic respiratory failure, wide complex V tach and new LBBB    Present Symptoms:       MEDICATIONS  (STANDING):  amiodarone    Tablet 200milliGRAM(s) Oral daily  latanoprost 0.005% Ophthalmic Solution 1Drop(s) Both EYES at bedtime  piperacillin/tazobactam IVPB. 3.375Gram(s) IV Intermittent every 8 hours  dexmedetomidine Infusion 0.4MICROgram(s)/kG/Hr IV Continuous <Continuous>  atorvastatin 40milliGRAM(s) Oral daily  phenylephrine    Infusion 0.5MICROgram(s)/kG/Min IV Continuous <Continuous>  aspirin  chewable 81milliGRAM(s) Oral daily  methylPREDNISolone sodium succinate Injectable 40milliGRAM(s) IV Push every 12 hours  pantoprazole   Suspension 40milliGRAM(s) Oral daily    MEDICATIONS  (PRN):  ondansetron Injectable 4milliGRAM(s) IV Push every 6 hours PRN Nausea and/or Vomiting  fentaNYL    Injectable 50MICROGram(s) IV Push every 6 hours PRN sedation  ALBUTerol    90 MICROgram(s) HFA Inhaler 2Puff(s) Inhalation every 6 hours PRN Bronchospasm  acetaminophen  Suppository 650milliGRAM(s) Rectal four times a day PRN For Temp greater than 38 C (100.4 F)      PHYSICAL EXAM:  Vital Signs Last 24 Hrs  T(C): 36.1, Max: 38.4 (06-13 @ 15:18)  T(F): 97, Max: 101.2 (06-13 @ 15:18)  HR: 83 (74 - 126)  BP: 125/50 (64/33 - 144/55)  BP(mean): 68 (40 - 78)  RR: 24 (16 - 34)  SpO2: 100% (97% - 100%)  General: patient sedated and intubated, nonverbal  Karnofsky Performance Score/Palliative Performance Status Version2:  10%    HEENT: dry lips, ET tube  Lungs: Patient intubated, + crackles at bases  CV: normal    GI: distended, incontinent, + bowel sounds, +orogastric tube  : ramirez  Musculoskeletal: bedbound  Skin: skin peeling off and large area of ecchymoses on both Distal UE and LE, anasarca x 4  Neuro: patient sedated.   Oral intake ability: unable/only mouth care   Diet: NPO - orogastric tube feedings    LABS:                          10.1   19.5  )-----------( 283      ( 14 Jun 2017 03:13 )             32.1     06-14    138  |  105  |  28<H>  ----------------------------<  171<H>  3.8   |  22  |  1.65<H>    Ca    7.3<L>      14 Jun 2017 03:13  Phos  2.7     06-14  Mg     1.9     06-14    TPro  5.5<L>  /  Alb  1.6<L>  /  TBili  0.6  /  DBili  x   /  AST  324<H>  /  ALT  242<H>  /  AlkPhos  94  06-14        RADIOLOGY & ADDITIONAL STUDIES: reviewed    ADVANCE DIRECTIVES: DNR/DNI, no escalation of care.   Advanced Care Planning discussion total time spent: OVERNIGHT EVENTS: ongoing acute on chronic respiratory failure, wide complex V tach and new LBBB    Present Symptoms: Patient sedated and intubated - unable to obtain ROS      MEDICATIONS  (STANDING):  amiodarone    Tablet 200milliGRAM(s) Oral daily  latanoprost 0.005% Ophthalmic Solution 1Drop(s) Both EYES at bedtime  piperacillin/tazobactam IVPB. 3.375Gram(s) IV Intermittent every 8 hours  dexmedetomidine Infusion 0.4MICROgram(s)/kG/Hr IV Continuous <Continuous>  atorvastatin 40milliGRAM(s) Oral daily  phenylephrine    Infusion 0.5MICROgram(s)/kG/Min IV Continuous <Continuous>  aspirin  chewable 81milliGRAM(s) Oral daily  methylPREDNISolone sodium succinate Injectable 40milliGRAM(s) IV Push every 12 hours  pantoprazole   Suspension 40milliGRAM(s) Oral daily    MEDICATIONS  (PRN):  ondansetron Injectable 4milliGRAM(s) IV Push every 6 hours PRN Nausea and/or Vomiting  fentaNYL    Injectable 50MICROGram(s) IV Push every 6 hours PRN sedation  ALBUTerol    90 MICROgram(s) HFA Inhaler 2Puff(s) Inhalation every 6 hours PRN Bronchospasm  acetaminophen  Suppository 650milliGRAM(s) Rectal four times a day PRN For Temp greater than 38 C (100.4 F)      PHYSICAL EXAM:  Vital Signs Last 24 Hrs  T(C): 36.1, Max: 38.4 (06-13 @ 15:18)  T(F): 97, Max: 101.2 (06-13 @ 15:18)  HR: 83 (74 - 126)  BP: 125/50 (64/33 - 144/55)  BP(mean): 68 (40 - 78)  RR: 24 (16 - 34)  SpO2: 100% (97% - 100%)  General: patient sedated and intubated, nonverbal  Karnofsky Performance Score/Palliative Performance Status Version2:  10%    HEENT: dry lips, ET tube  Lungs: Patient intubated, + crackles at bases  CV: normal    GI: distended, incontinent, + bowel sounds, +orogastric tube  : ramirez  Musculoskeletal: bedbound  Skin: skin peeling off and large area of ecchymoses on both Distal UE and LE, anasarca x 4  Neuro: patient sedated.   Oral intake ability: unable/only mouth care   Diet: NPO - orogastric tube feedings    LABS:                          10.1   19.5  )-----------( 283      ( 14 Jun 2017 03:13 )             32.1     06-14    138  |  105  |  28<H>  ----------------------------<  171<H>  3.8   |  22  |  1.65<H>    Ca    7.3<L>      14 Jun 2017 03:13  Phos  2.7     06-14  Mg     1.9     06-14    TPro  5.5<L>  /  Alb  1.6<L>  /  TBili  0.6  /  DBili  x   /  AST  324<H>  /  ALT  242<H>  /  AlkPhos  94  06-14        RADIOLOGY & ADDITIONAL STUDIES: reviewed    ADVANCE DIRECTIVES: DNR/DNI, no escalation of care.   Advanced Care Planning discussion total time spent: 30 minutes

## 2017-06-14 NOTE — PROGRESS NOTE ADULT - SUBJECTIVE AND OBJECTIVE BOX
[facility] NEPHROLOGY- PROGRESS NOTE    85y Female with h/o DVT, Lupus anticoagulant on A/C, Parox. AFib, HTN, COPD on home O2, a/w PNA. Hosp cb hypoxic respiratory failure s/p intubation now with septic shock, hypotensive on pressors with REGGIE.      Hospital Medications: Medications reviewed.  REVIEW OF SYSTEMS: Unable to obtain    VITALS:  T(F): 97.2, Max: 101.7 ( @ 12:30)  HR: 77  BP: 130/48  RR: 20  SpO2: 100%  Wt(kg): --  I & Os for 24h ending  @ 07:00  =============================================  IN: 3870.3 ml / OUT: 215 ml / NET: 3655.3 ml    I & Os for current day (as of  @ 11:17)  =============================================  IN: 232.3 ml / OUT: 120 ml / NET: 112.3 ml      PHYSICAL EXAM:  Constitutional: Sedated  Respiratory: trace rales at right base, decreased BS at left base; intubated  Cardiovascular: S1, S2, tachy  Gastrointestinal: BS+, soft, NT/ND  Extremities: trace pedal edema    LABS:      138  |  105  |  28<H>  ----------------------------<  171<H>  3.8   |  22  |  1.65<H>    Ca    7.3<L>      2017 03:13  Phos  2.7       Mg     1.9         TPro  5.5<L>  /  Alb  1.6<L>  /  TBili  0.6  /  DBili      /  AST  324<H>  /  ALT  242<H>  /  AlkPhos  94      Creatinine Trend: 1.65 <--, 1.09 <--, 0.58 <--, 0.46 <--, 0.81 <--                        10.1   19.5  )-----------( 283      ( 2017 03:13 )             32.1     Urine Studies:  Urinalysis Basic - ( 2017 13:25 )    Color: Yellow / Appearance: Clear / S.010 / pH:   Gluc:  / Ketone: Negative  / Bili: Negative / Urobili: 4   Blood:  / Protein: 100 / Nitrite: Positive   Leuk Esterase: Moderate / RBC: 25-50 /HPF / WBC 3-5 /HPF   Sq Epi:  / Non Sq Epi: Few / Bacteria: Moderate /HPF

## 2017-06-14 NOTE — PROGRESS NOTE ADULT - SUBJECTIVE AND OBJECTIVE BOX
INTERVAL HPI/OVERNIGHT EVENTS: wide complex VTcah and new LBBB    PRESSORS: [x ] YES [ ] NO  WHICH: phenylephrine    ANTIBIOTICS: VANCO, ZOSYN                 DATE STARTED: 6/11/17      Antimicrobial:  piperacillin/tazobactam IVPB. 3.375Gram(s) IV Intermittent every 8 hours  vancomycin  IVPB 750milliGRAM(s) IV Intermittent every 12 hours    Cardiovascular:  amiodarone    Tablet 200milliGRAM(s) Oral daily  phenylephrine    Infusion 0.5MICROgram(s)/kG/Min IV Continuous <Continuous>    Pulmonary:  ALBUTerol    90 MICROgram(s) HFA Inhaler 2Puff(s) Inhalation every 6 hours PRN    Hematalogic:  aspirin  chewable 81milliGRAM(s) Oral daily    Other:  latanoprost 0.005% Ophthalmic Solution 1Drop(s) Both EYES at bedtime  ondansetron Injectable 4milliGRAM(s) IV Push every 6 hours PRN  dexmedetomidine Infusion 0.4MICROgram(s)/kG/Hr IV Continuous <Continuous>  fentaNYL    Injectable 50MICROGram(s) IV Push every 6 hours PRN  pantoprazole  Injectable 40milliGRAM(s) IV Push daily  methylPREDNISolone sodium succinate Injectable 40milliGRAM(s) IV Push every 6 hours  acetaminophen  Suppository 650milliGRAM(s) Rectal four times a day PRN  atorvastatin 40milliGRAM(s) Oral daily    amiodarone    Tablet 200milliGRAM(s) Oral daily  latanoprost 0.005% Ophthalmic Solution 1Drop(s) Both EYES at bedtime  piperacillin/tazobactam IVPB. 3.375Gram(s) IV Intermittent every 8 hours  vancomycin  IVPB 750milliGRAM(s) IV Intermittent every 12 hours  ondansetron Injectable 4milliGRAM(s) IV Push every 6 hours PRN  dexmedetomidine Infusion 0.4MICROgram(s)/kG/Hr IV Continuous <Continuous>  fentaNYL    Injectable 50MICROGram(s) IV Push every 6 hours PRN  pantoprazole  Injectable 40milliGRAM(s) IV Push daily  methylPREDNISolone sodium succinate Injectable 40milliGRAM(s) IV Push every 6 hours  ALBUTerol    90 MICROgram(s) HFA Inhaler 2Puff(s) Inhalation every 6 hours PRN  acetaminophen  Suppository 650milliGRAM(s) Rectal four times a day PRN  atorvastatin 40milliGRAM(s) Oral daily  phenylephrine    Infusion 0.5MICROgram(s)/kG/Min IV Continuous <Continuous>  aspirin  chewable 81milliGRAM(s) Oral daily    Drug Dosing Weight  Height (cm): 152.4 (13 Jun 2017 03:24)  Weight (kg): 44.1 (13 Jun 2017 03:24)  BMI (kg/m2): 19 (13 Jun 2017 03:24)  BSA (m2): 1.37 (13 Jun 2017 03:24)    CENTRAL LINE: [X ] YES [ ] NO  LOCATION: [facility]  DATE INSERTED: 6/13/17  REMOVE: [ ] YES [x ] NO  EXPLAIN:    ZEE: [x ] YES [ ] NO    DATE INSERTED: 6/13  REMOVE:  [ ] YES [x ] NO  EXPLAIN:    A-LINE:  [ ] YES [x ] NO  LOCATION:   DATE INSERTED:  REMOVE:  [ ] YES [ ] NO  EXPLAIN:    PMH -reviewed admission note, no change since admission  Heart faliure: acute [ ] chronic [ ] acute or chronic [ ] diastolic [ ] systolic [ ] combied systolic and diastolic[ ]  REGGIE: ATN[ ] renal medullary necrosis [ ] CKD I [ ]CKDII [ ]CKD III [ ]CKD IV [ ]CKD V [ ]Other pathological lesions [ ]  Abdominal Nutrition Status: malnutrition [ ] cachexia [ ] morbid obesity/BMI=40 [ ] Supplement ordered [___________]     ICU Vital Signs Last 24 Hrs  T(C): 36.2, Max: 38.7 (06-13 @ 12:30)  T(F): 97.2, Max: 101.7 (06-13 @ 12:30)  HR: 85 (74 - 126)  BP: 97/50 (64/33 - 144/55)  BP(mean): 61 (40 - 83)  ABP: --  ABP(mean): --  RR: 23 (16 - 34)  SpO2: 100% (96% - 100%)      ABG - ( 13 Jun 2017 13:43 )  pH: 7.35  /  pCO2: 33    /  pO2: 88    / HCO3: 17    / Base Excess: -7.0  /  SaO2: 97          Mode: AC/ CMV (Assist Control/ Continuous Mandatory Ventilation)  RR (machine): 20  TV (machine): 350  FiO2: 35  PEEP: 5  ITime: 1  MAP: 10  PIP: 26      PHYSICAL EXAM:    GENERAL: [ ]NAD, [ ]well-groomed, [ ]well-developed  HEAD:  [ ]Atraumatic, [ ]Normocephalic  EYES: [ ]EOMI, [ ]PERRLA, [ ]conjunctiva and sclera clear  ENMT: [ ]No tonsillar erythema, exudates, or enlargement; [ ]Moist mucous membranes, [ ]Good dentition, [ ]No lesions  NECK: [ ]Supple, normal appearance, [ ]No JVD; [ ]Normal thyroid; [ ]Trachea midline  NERVOUS SYSTEM:  [ ]Alert & Oriented X3, [ ]Good concentration; [ ]Motor Strength 5/5 B/L upper and lower extremities; [ ]DTRs 2+ intact and symmetric  CHEST/LUNG: [ ]No chest deformity; [ ]Normal percussion bilaterally; [ ]No rales, rhonchi, wheezing   HEART: [ ]Regular rate and rhythm; [ ]No murmurs, rubs, or gallops  ABDOMEN: very distended; [ x]Bowel sounds present  EXTREMITIES:  [ ]2+ Peripheral Pulses, 2+ pedal edema b/l LE with skin peeling off and large area of ecchymoses on both Distal UE and LE  LYMPH: [ ]No lymphadenopathy noted  SKIN:  skin peeling off and large area of ecchymoses on both Distal UE and LE      LABS:  CBC Full  -  ( 14 Jun 2017 03:13 )  WBC Count : 19.5 K/uL  Hemoglobin : 10.1 g/dL  Hematocrit : 32.1 %  Platelet Count - Automated : 283 K/uL  Mean Cell Volume : 101.2 fl  Mean Cell Hemoglobin : 31.9 pg  Mean Cell Hemoglobin Concentration : 31.5 gm/dL  Auto Neutrophil # : 17.8 K/uL  Auto Lymphocyte # : 0.4 K/uL  Auto Monocyte # : 1.2 K/uL  Auto Eosinophil # : 0.0 K/uL  Auto Basophil # : 0.1 K/uL  Auto Neutrophil % : 91.3 %  Auto Lymphocyte % : 2.2 %  Auto Monocyte % : 6.2 %  Auto Eosinophil % : 0.0 %  Auto Basophil % : 0.3 %    06-14    138  |  105  |  28<H>  ----------------------------<  171<H>  3.8   |  22  |  1.65<H>    Ca    7.3<L>      14 Jun 2017 03:13  Phos  2.7     06-14  Mg     1.9     06-14    TPro  5.5<L>  /  Alb  1.6<L>  /  TBili  0.6  /  DBili  x   /  AST  324<H>  /  ALT  242<H>  /  AlkPhos  94  06-14    PT/INR - ( 14 Jun 2017 03:13 )   PT: 61.1 sec;   INR: 5.42 ratio         PTT - ( 13 Jun 2017 13:58 )  PTT:48.0 sec    Culture Results:   No growth to date. (06-13 @ 12:36)  Culture Results:   No growth to date. (06-12 @ 01:50)  Culture Results:   No growth to date. (06-12 @ 01:50)      RADIOLOGY & ADDITIONAL STUDIES REVIEWED:  ***    [ ]GOALS OF CARE DISCUSSION WITH PATIENT/FAMILY/PROXY:    CRITICAL CARE TIME SPENT: 35 minutes INTERVAL HPI/OVERNIGHT EVENTS: wide complex VTcah and new LBBB    PRESSORS: [x ] YES [ ] NO  WHICH: phenylephrine    ANTIBIOTICS: VANCO, ZOSYN                 DATE STARTED: 6/11/17      Antimicrobial:  piperacillin/tazobactam IVPB. 3.375Gram(s) IV Intermittent every 8 hours  vancomycin  IVPB 750milliGRAM(s) IV Intermittent every 12 hours    Cardiovascular:  amiodarone    Tablet 200milliGRAM(s) Oral daily  phenylephrine    Infusion 0.5MICROgram(s)/kG/Min IV Continuous <Continuous>    Pulmonary:  ALBUTerol    90 MICROgram(s) HFA Inhaler 2Puff(s) Inhalation every 6 hours PRN    Hematalogic:  aspirin  chewable 81milliGRAM(s) Oral daily    Other:  latanoprost 0.005% Ophthalmic Solution 1Drop(s) Both EYES at bedtime  ondansetron Injectable 4milliGRAM(s) IV Push every 6 hours PRN  dexmedetomidine Infusion 0.4MICROgram(s)/kG/Hr IV Continuous <Continuous>  fentaNYL    Injectable 50MICROGram(s) IV Push every 6 hours PRN  pantoprazole  Injectable 40milliGRAM(s) IV Push daily  methylPREDNISolone sodium succinate Injectable 40milliGRAM(s) IV Push every 6 hours  acetaminophen  Suppository 650milliGRAM(s) Rectal four times a day PRN  atorvastatin 40milliGRAM(s) Oral daily    amiodarone    Tablet 200milliGRAM(s) Oral daily  latanoprost 0.005% Ophthalmic Solution 1Drop(s) Both EYES at bedtime  piperacillin/tazobactam IVPB. 3.375Gram(s) IV Intermittent every 8 hours  vancomycin  IVPB 750milliGRAM(s) IV Intermittent every 12 hours  ondansetron Injectable 4milliGRAM(s) IV Push every 6 hours PRN  dexmedetomidine Infusion 0.4MICROgram(s)/kG/Hr IV Continuous <Continuous>  fentaNYL    Injectable 50MICROGram(s) IV Push every 6 hours PRN  pantoprazole  Injectable 40milliGRAM(s) IV Push daily  methylPREDNISolone sodium succinate Injectable 40milliGRAM(s) IV Push every 6 hours  ALBUTerol    90 MICROgram(s) HFA Inhaler 2Puff(s) Inhalation every 6 hours PRN  acetaminophen  Suppository 650milliGRAM(s) Rectal four times a day PRN  atorvastatin 40milliGRAM(s) Oral daily  phenylephrine    Infusion 0.5MICROgram(s)/kG/Min IV Continuous <Continuous>  aspirin  chewable 81milliGRAM(s) Oral daily    Drug Dosing Weight  Height (cm): 152.4 (13 Jun 2017 03:24)  Weight (kg): 44.1 (13 Jun 2017 03:24)  BMI (kg/m2): 19 (13 Jun 2017 03:24)  BSA (m2): 1.37 (13 Jun 2017 03:24)    CENTRAL LINE: [X ] YES [ ] NO  LOCATION: [facility]  DATE INSERTED: 6/13/17  REMOVE: [ ] YES [x ] NO  EXPLAIN:    ZEE: [x ] YES [ ] NO    DATE INSERTED: 6/13  REMOVE:  [ ] YES [x ] NO  EXPLAIN:    A-LINE:  [ ] YES [x ] NO  LOCATION:   DATE INSERTED:  REMOVE:  [ ] YES [ ] NO  EXPLAIN:    PMH -reviewed admission note, no change since admission  Heart faliure: acute [ ] chronic [ ] acute or chronic [ ] diastolic [ ] systolic [ ] combied systolic and diastolic[ ]  REGGIE: ATN[ ] renal medullary necrosis [ ] CKD I [ ]CKDII [ ]CKD III [ ]CKD IV [ ]CKD V [ ]Other pathological lesions [ ]  Abdominal Nutrition Status: malnutrition [ ] cachexia [ ] morbid obesity/BMI=40 [ ] Supplement ordered [___________]     ICU Vital Signs Last 24 Hrs  T(C): 36.2, Max: 38.7 (06-13 @ 12:30)  T(F): 97.2, Max: 101.7 (06-13 @ 12:30)  HR: 85 (74 - 126)  BP: 97/50 (64/33 - 144/55)  BP(mean): 61 (40 - 83)  ABP: --  ABP(mean): --  RR: 23 (16 - 34)  SpO2: 100% (96% - 100%)      ABG - ( 13 Jun 2017 13:43 )  pH: 7.35  /  pCO2: 33    /  pO2: 88    / HCO3: 17    / Base Excess: -7.0  /  SaO2: 97          Mode: AC/ CMV (Assist Control/ Continuous Mandatory Ventilation)  RR (machine): 20  TV (machine): 350  FiO2: 35  PEEP: 5  ITime: 1  MAP: 10  PIP: 26      PHYSICAL EXAM:    GENERAL: [ ]NAD, [ ]well-groomed, [ ]well-developed  HEAD:  [x ]Atraumatic, [x ]Normocephalic  EYES: [ ]EOMI, [ ]PERRLA, [ ]conjunctiva and sclera clear  ENMT: [ ]No tonsillar erythema, exudates, or enlargement; [x ]Moist mucous membranes, [ ]Good dentition, [ ]No lesions  NECK: [ ]Supple, normal appearance, [ ]No JVD; [ ]Normal thyroid; [ ]Trachea midline  NERVOUS SYSTEM:  [ ]Alert & Oriented X3, [ ]Good concentration; [ ]Motor Strength 5/5 B/L upper and lower extremities; [ ]DTRs 2+ intact and symmetric, sedated on precedex  CHEST/LUNG: [x ]No chest deformity; [ ]Normal percussion bilaterally; [ ]No rales, rhonchi, wheezing crackles at bases  HEART: [ ]Regular rate and rhythm; [ ]No murmurs, rubs, or gallops  ABDOMEN: very distended; [ x]Bowel sounds present  EXTREMITIES:  [ ]2+ Peripheral Pulses, 2+ pedal edema b/l LE with skin peeling off and large area of ecchymoses on both Distal UE and LE  LYMPH: [x ]No lymphadenopathy noted  SKIN:  skin peeling off and large area of ecchymoses on both Distal UE and LE      LABS:  CBC Full  -  ( 14 Jun 2017 03:13 )  WBC Count : 19.5 K/uL  Hemoglobin : 10.1 g/dL  Hematocrit : 32.1 %  Platelet Count - Automated : 283 K/uL  Mean Cell Volume : 101.2 fl  Mean Cell Hemoglobin : 31.9 pg  Mean Cell Hemoglobin Concentration : 31.5 gm/dL  Auto Neutrophil # : 17.8 K/uL  Auto Lymphocyte # : 0.4 K/uL  Auto Monocyte # : 1.2 K/uL  Auto Eosinophil # : 0.0 K/uL  Auto Basophil # : 0.1 K/uL  Auto Neutrophil % : 91.3 %  Auto Lymphocyte % : 2.2 %  Auto Monocyte % : 6.2 %  Auto Eosinophil % : 0.0 %  Auto Basophil % : 0.3 %    06-14    138  |  105  |  28<H>  ----------------------------<  171<H>  3.8   |  22  |  1.65<H>    Ca    7.3<L>      14 Jun 2017 03:13  Phos  2.7     06-14  Mg     1.9     06-14    TPro  5.5<L>  /  Alb  1.6<L>  /  TBili  0.6  /  DBili  x   /  AST  324<H>  /  ALT  242<H>  /  AlkPhos  94  06-14    PT/INR - ( 14 Jun 2017 03:13 )   PT: 61.1 sec;   INR: 5.42 ratio         PTT - ( 13 Jun 2017 13:58 )  PTT:48.0 sec    Culture Results:   No growth to date. (06-13 @ 12:36)  Culture Results:   No growth to date. (06-12 @ 01:50)  Culture Results:   No growth to date. (06-12 @ 01:50)      RADIOLOGY & ADDITIONAL STUDIES REVIEWED:  Rt base effusion worsening.    [ ]GOALS OF CARE DISCUSSION WITH PATIENT/FAMILY/PROXY:    CRITICAL CARE TIME SPENT: 35 minutes

## 2017-06-14 NOTE — PROGRESS NOTE ADULT - PROBLEM SELECTOR PLAN 2
elevated liver enzymes: elevated liver enzymes lfts trending down likely sec to cholelithiasis.  USG Abd showed cholelithiasis and starry dinae appearnace

## 2017-06-14 NOTE — PROGRESS NOTE ADULT - PROBLEM SELECTOR PLAN 3
Bronchodilators  Oxygen supp  Pfts as outpatient Panculture  Antibiotics  ID consult  Oxygen supp  follow up CXR

## 2017-06-15 LAB
CULTURE RESULTS: SIGNIFICANT CHANGE UP
SPECIMEN SOURCE: SIGNIFICANT CHANGE UP

## 2017-06-15 PROCEDURE — 85027 COMPLETE CBC AUTOMATED: CPT

## 2017-06-15 PROCEDURE — 80048 BASIC METABOLIC PNL TOTAL CA: CPT

## 2017-06-15 PROCEDURE — 80061 LIPID PANEL: CPT

## 2017-06-15 PROCEDURE — 76705 ECHO EXAM OF ABDOMEN: CPT

## 2017-06-15 PROCEDURE — 80074 ACUTE HEPATITIS PANEL: CPT

## 2017-06-15 PROCEDURE — 82550 ASSAY OF CK (CPK): CPT

## 2017-06-15 PROCEDURE — 93970 EXTREMITY STUDY: CPT

## 2017-06-15 PROCEDURE — 82803 BLOOD GASES ANY COMBINATION: CPT

## 2017-06-15 PROCEDURE — 71275 CT ANGIOGRAPHY CHEST: CPT

## 2017-06-15 PROCEDURE — 87633 RESP VIRUS 12-25 TARGETS: CPT

## 2017-06-15 PROCEDURE — 87086 URINE CULTURE/COLONY COUNT: CPT

## 2017-06-15 PROCEDURE — 82306 VITAMIN D 25 HYDROXY: CPT

## 2017-06-15 PROCEDURE — 93306 TTE W/DOPPLER COMPLETE: CPT

## 2017-06-15 PROCEDURE — 85730 THROMBOPLASTIN TIME PARTIAL: CPT

## 2017-06-15 PROCEDURE — 96375 TX/PRO/DX INJ NEW DRUG ADDON: CPT

## 2017-06-15 PROCEDURE — 94640 AIRWAY INHALATION TREATMENT: CPT

## 2017-06-15 PROCEDURE — 87581 M.PNEUMON DNA AMP PROBE: CPT

## 2017-06-15 PROCEDURE — 71045 X-RAY EXAM CHEST 1 VIEW: CPT

## 2017-06-15 PROCEDURE — 99285 EMERGENCY DEPT VISIT HI MDM: CPT | Mod: 25

## 2017-06-15 PROCEDURE — 82553 CREATINE MB FRACTION: CPT

## 2017-06-15 PROCEDURE — 81001 URINALYSIS AUTO W/SCOPE: CPT

## 2017-06-15 PROCEDURE — 87040 BLOOD CULTURE FOR BACTERIA: CPT

## 2017-06-15 PROCEDURE — 87798 DETECT AGENT NOS DNA AMP: CPT

## 2017-06-15 PROCEDURE — 87070 CULTURE OTHR SPECIMN AEROBIC: CPT

## 2017-06-15 PROCEDURE — 83735 ASSAY OF MAGNESIUM: CPT

## 2017-06-15 PROCEDURE — 84443 ASSAY THYROID STIM HORMONE: CPT

## 2017-06-15 PROCEDURE — 84484 ASSAY OF TROPONIN QUANT: CPT

## 2017-06-15 PROCEDURE — 94002 VENT MGMT INPAT INIT DAY: CPT

## 2017-06-15 PROCEDURE — 96374 THER/PROPH/DIAG INJ IV PUSH: CPT

## 2017-06-15 PROCEDURE — 80053 COMPREHEN METABOLIC PANEL: CPT

## 2017-06-15 PROCEDURE — 85610 PROTHROMBIN TIME: CPT

## 2017-06-15 PROCEDURE — 83036 HEMOGLOBIN GLYCOSYLATED A1C: CPT

## 2017-06-15 PROCEDURE — 93005 ELECTROCARDIOGRAM TRACING: CPT

## 2017-06-15 PROCEDURE — 87486 CHLMYD PNEUM DNA AMP PROBE: CPT

## 2017-06-15 PROCEDURE — 83605 ASSAY OF LACTIC ACID: CPT

## 2017-06-15 PROCEDURE — 83880 ASSAY OF NATRIURETIC PEPTIDE: CPT

## 2017-06-15 PROCEDURE — 84100 ASSAY OF PHOSPHORUS: CPT

## 2017-06-15 PROCEDURE — 94003 VENT MGMT INPAT SUBQ DAY: CPT

## 2017-06-15 RX ORDER — FENTANYL CITRATE 50 UG/ML
50 INJECTION INTRAVENOUS ONCE
Qty: 0 | Refills: 0 | Status: DISCONTINUED | OUTPATIENT
Start: 2017-06-15 | End: 2017-06-15

## 2017-06-15 RX ORDER — HYDROMORPHONE HYDROCHLORIDE 2 MG/ML
0.5 INJECTION INTRAMUSCULAR; INTRAVENOUS; SUBCUTANEOUS ONCE
Qty: 0 | Refills: 0 | Status: DISCONTINUED | OUTPATIENT
Start: 2017-06-15 | End: 2017-06-15

## 2017-06-15 RX ADMIN — FENTANYL CITRATE 50 MICROGRAM(S): 50 INJECTION INTRAVENOUS at 00:10

## 2017-06-15 RX ADMIN — HYDROMORPHONE HYDROCHLORIDE 0.5 MILLIGRAM(S): 2 INJECTION INTRAMUSCULAR; INTRAVENOUS; SUBCUTANEOUS at 01:16

## 2017-06-15 RX ADMIN — Medication 650 MILLIGRAM(S): at 00:41

## 2017-06-15 RX ADMIN — HYDROMORPHONE HYDROCHLORIDE 0.5 MILLIGRAM(S): 2 INJECTION INTRAMUSCULAR; INTRAVENOUS; SUBCUTANEOUS at 01:07

## 2017-06-15 NOTE — DISCHARGE NOTE FOR THE EXPIRED PATIENT - HOSPITAL COURSE
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 pt was admitted with aspiration pneumonia on vanc and zosyn. RRt called for respiratory distress. patient admitted to ICU for acute hypoxic respiratory failure 2/2 aspiration pneumonia and patient intubated for the same reason and patient developed septic shock  later on was started on pressors and monitored in ICU and patient also had a new onset LBBB and WIDE COMPLEX TACHYCARDIA and patient family contacted and palliative team consulted as per patient family given the multiple high risk conditions and given the co-morbities and poor prognosis opined for no active interventions and opined for hospice .  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 pt was admitted with aspiration pneumonia on vanc and zosyn. RRt called for respiratory distress. patient admitted to ICU for acute hypoxic respiratory failure 2/2 aspiration pneumonia and patient intubated for the same reason and patient developed septic shock  later on was started on pressors and monitored in ICU and patient also had a new onset LBBB and WIDE COMPLEX TACHYCARDIA and patient family contacted and palliative team consulted as per patient family given the multiple high risk conditions and given the co-morbities and poor prognosis opined for no active interventions, no escalation of care and opined for hospice .    patient  4/15/17 at 4.40 am and family opted for no autopsy

## 2017-06-17 LAB
CULTURE RESULTS: SIGNIFICANT CHANGE UP
CULTURE RESULTS: SIGNIFICANT CHANGE UP
SPECIMEN SOURCE: SIGNIFICANT CHANGE UP
SPECIMEN SOURCE: SIGNIFICANT CHANGE UP

## 2017-06-20 DIAGNOSIS — I10 ESSENTIAL (PRIMARY) HYPERTENSION: ICD-10-CM

## 2017-06-20 DIAGNOSIS — D64.9 ANEMIA, UNSPECIFIED: ICD-10-CM

## 2017-06-20 DIAGNOSIS — Z66 DO NOT RESUSCITATE: ICD-10-CM

## 2017-06-20 DIAGNOSIS — J96.21 ACUTE AND CHRONIC RESPIRATORY FAILURE WITH HYPOXIA: ICD-10-CM

## 2017-06-20 DIAGNOSIS — Z51.5 ENCOUNTER FOR PALLIATIVE CARE: ICD-10-CM

## 2017-06-20 DIAGNOSIS — R31.29 OTHER MICROSCOPIC HEMATURIA: ICD-10-CM

## 2017-06-20 DIAGNOSIS — J69.0 PNEUMONITIS DUE TO INHALATION OF FOOD AND VOMIT: ICD-10-CM

## 2017-06-20 DIAGNOSIS — J44.1 CHRONIC OBSTRUCTIVE PULMONARY DISEASE WITH (ACUTE) EXACERBATION: ICD-10-CM

## 2017-06-20 DIAGNOSIS — Z87.891 PERSONAL HISTORY OF NICOTINE DEPENDENCE: ICD-10-CM

## 2017-06-20 DIAGNOSIS — Z86.718 PERSONAL HISTORY OF OTHER VENOUS THROMBOSIS AND EMBOLISM: ICD-10-CM

## 2017-06-20 DIAGNOSIS — D68.62 LUPUS ANTICOAGULANT SYNDROME: ICD-10-CM

## 2017-06-20 DIAGNOSIS — Z79.01 LONG TERM (CURRENT) USE OF ANTICOAGULANTS: ICD-10-CM

## 2017-06-20 DIAGNOSIS — A41.9 SEPSIS, UNSPECIFIED ORGANISM: ICD-10-CM

## 2017-06-20 DIAGNOSIS — E78.5 HYPERLIPIDEMIA, UNSPECIFIED: ICD-10-CM

## 2017-06-20 DIAGNOSIS — I48.0 PAROXYSMAL ATRIAL FIBRILLATION: ICD-10-CM

## 2017-06-20 DIAGNOSIS — N39.0 URINARY TRACT INFECTION, SITE NOT SPECIFIED: ICD-10-CM

## 2017-06-30 RX ORDER — PANTOPRAZOLE SODIUM 20 MG/1
1 TABLET, DELAYED RELEASE ORAL
Qty: 0 | Refills: 0 | COMMUNITY

## 2017-06-30 RX ORDER — AMIODARONE HYDROCHLORIDE 400 MG/1
1 TABLET ORAL
Qty: 0 | Refills: 0 | COMMUNITY

## 2017-06-30 RX ORDER — DILTIAZEM HCL 120 MG
1 CAPSULE, EXT RELEASE 24 HR ORAL
Qty: 0 | Refills: 0 | COMMUNITY

## 2017-06-30 RX ORDER — TRAVOPROST 0.04 MG/ML
1 SOLUTION/ DROPS OPHTHALMIC
Qty: 0 | Refills: 0 | COMMUNITY

## 2017-06-30 RX ORDER — ALPRAZOLAM 0.25 MG
1 TABLET ORAL
Qty: 0 | Refills: 0 | COMMUNITY

## 2017-06-30 RX ORDER — TIOTROPIUM BROMIDE 18 UG/1
1 CAPSULE ORAL; RESPIRATORY (INHALATION)
Qty: 0 | Refills: 0 | COMMUNITY

## 2017-06-30 RX ORDER — FLUTICASONE PROPIONATE AND SALMETEROL 50; 250 UG/1; UG/1
1 POWDER ORAL; RESPIRATORY (INHALATION)
Qty: 0 | Refills: 0 | COMMUNITY

## 2017-06-30 RX ORDER — WARFARIN SODIUM 2.5 MG/1
1 TABLET ORAL
Qty: 0 | Refills: 0 | COMMUNITY

## 2017-06-30 RX ORDER — ATORVASTATIN CALCIUM 80 MG/1
1 TABLET, FILM COATED ORAL
Qty: 0 | Refills: 0 | COMMUNITY

## 2018-06-20 NOTE — ADVANCED PRACTICE NURSE CONSULT - RECOMMEDATIONS
Writer followed up with pharmacy - [facility] that they had received most recent refill request for hydrocortisone suspension, this was confirmed by pharmacy staff on 6/20/18 at appx [phone]   -Apply a Foam dressing to the Sacrum Q 7hrs, for protection  -Elevate/float the patients heels using heel protectors and reposition the patient Q 2hrs using wedges or pillows

## 2019-09-10 NOTE — CONSULT NOTE ADULT - PROBLEM/RECOMMENDATION-3
DISPLAY PLAN FREE TEXT
street drug/inhalant/medication abuse

## 2020-06-04 NOTE — PROCEDURE NOTE - NSTRACHIMAGING_RESP_A_CORE
How Severe Are Your Spot(S)?: mild
Have Your Spot(S) Been Treated In The Past?: has not been treated
Hpi Title: Evaluation of a Skin Lesion
.

## 2021-06-28 NOTE — PROGRESS NOTE ADULT - PROBLEM/PLAN-4
152.4
DISPLAY PLAN FREE TEXT

## 2022-01-18 NOTE — H&P ADULT - PMH
Results and RTW guidelines:    COVID RESULT:    [x] Viewed by employee in [facility]. RTW plan and instructions as indicated on triage call. Manager notified. Estimated RTW date: 1/17/22  [] Discussed with employee   [] Unable to reach by phone.   Sent vi DVT (deep venous thrombosis)    Lupus anticoagulant disorder
